# Patient Record
Sex: FEMALE | Race: WHITE
[De-identification: names, ages, dates, MRNs, and addresses within clinical notes are randomized per-mention and may not be internally consistent; named-entity substitution may affect disease eponyms.]

---

## 2017-07-28 ENCOUNTER — HOSPITAL ENCOUNTER (EMERGENCY)
Dept: HOSPITAL 47 - EC | Age: 22
Discharge: HOME | End: 2017-07-28
Payer: COMMERCIAL

## 2017-07-28 VITALS
TEMPERATURE: 97.9 F | RESPIRATION RATE: 20 BRPM | HEART RATE: 82 BPM | SYSTOLIC BLOOD PRESSURE: 121 MMHG | DIASTOLIC BLOOD PRESSURE: 59 MMHG

## 2017-07-28 DIAGNOSIS — Z88.2: ICD-10-CM

## 2017-07-28 DIAGNOSIS — L56.8: ICD-10-CM

## 2017-07-28 DIAGNOSIS — R51: Primary | ICD-10-CM

## 2017-07-28 DIAGNOSIS — Z88.8: ICD-10-CM

## 2017-07-28 DIAGNOSIS — M54.2: ICD-10-CM

## 2017-07-28 PROCEDURE — 99283 EMERGENCY DEPT VISIT LOW MDM: CPT

## 2017-07-28 NOTE — ED
General Adult HPI





- General


Chief complaint: Headache


Stated complaint: Headache


Time Seen by Provider: 07/28/17 15:24


Source: patient, RN notes reviewed


Mode of arrival: ambulatory


Limitations: no limitations





- History of Present Illness


Initial comments: 





Patient 22-year-old female who presents emergency room today with a chief 

complaint of a headache.  She states she's been having headaches off and on 

over the last 3 months.  She believes they are migraine-type headaches that she 

has had some photosensitivity.  States currently not having headache at this 

time.  That over the last few days she's also noticed some neck pain as well.  

She denies any other complaints or associated symptoms.  Denies any injury or 

trauma. states came here to the emergency room because her son has had a fever 

had him come in to be seen and she thought she would be seen at same time as 

she does not have an appointment with the family doctor for 2 weeks.Patient 

denies any recent fever, chills, shortness of breath, chest pain, back pain, 

abdominal pain, nausea or vomiting, numbness or tingling, dysuria or hematuria, 

constipation or diarrhea, or any other complaints.





- Related Data


 Home Medications











 Medication  Instructions  Recorded  Confirmed


 


Albuterol Inhaler [Ventolin Hfa 2 puff INHALATION RT-Q6H PRN 12/12/15 11/23/16





Inhaler]   








 Previous Rx's











 Medication  Instructions  Recorded


 


Nitrofurantoin Monohyd/M-Cryst 100 mg PO Q12HR #10 cap 11/23/16





[Macrobid]  


 


Butalb/APAP/Caff -40Mg 1 tab PO Q4-6H PRN #20 tablet 07/28/17





[Fioricet -40]  











 Allergies











Allergy/AdvReac Type Severity Reaction Status Date / Time


 


promethazine HCl Allergy  Rash/Hives Verified 11/23/16 17:02





[From Phenergan]     


 


Sulfa (Sulfonamide Allergy  Rash/Hives Verified 11/23/16 17:02





Antibiotics)     














Review of Systems


ROS Statement: 


Those systems with pertinent positive or pertinent negative responses have been 

documented in the HPI.





ROS Other: All systems not noted in ROS Statement are negative.





Past Medical History


Past Medical History: Asthma


History of Any Multi-Drug Resistant Organisms: None Reported


Additional Past Surgical History / Comment(s): LEFT LUNG SURGERY AS AN INFANT, 

ear surgery


Past Anesthesia/Blood Transfusion Reactions: No Reported Reaction


Past Psychological History: Anxiety


Smoking Status: Never smoker


Past Alcohol Use History: None Reported


Past Drug Use History: None Reported





- Past Family History


  ** Mother


Family Medical History: Diabetes Mellitus


Additional Family Medical History / Comment(s): heart disease





General Exam





- General Exam Comments


Initial Comments: 





General:  The patient is awake and alert, in no distress, and does not appear 

acutely ill. 


Eye:  Pupils are equal, round and reactive to light, extra-ocular movements are 

intact.  No nystagmus.  There is normal conjunctiva bilaterally.  No signs of 

icterus.  


Ears, nose, mouth and throat:  There are moist mucous membranes and no oral 

lesions. 


Neck:  The neck is supple, there is no tenderness or JVD.  


Cardiovascular:  There is a regular rate and rhythm. No murmur, rub or gallop 

is appreciated.


Respiratory:  Lungs are clear to auscultation, respirations are non-labored, 

breath sounds are equal.  No wheezes, stridor, rales, or rhonchi.


Gastrointestinal:  Soft, non-distended, non-tender abdomen without masses or 

organomegaly noted. There is no rebound or guarding present.  No CVA 

tenderness. Bowel sounds are unremarkable.


Musculoskeletal:  Normal ROM, no tenderness.  Strength 5/5. Sensation intact. 

Pulses equal bilaterally 2+.  


Neurological:  A&O x 3. CN II-XII intact, There are no obvious motor or sensory 

deficits. Coordination appears grossly intact. Speech is normal.


Skin:  Skin is warm and dry and no rashes or lesions are noted. 


Psychiatric:  Cooperative, appropriate mood & affect, normal judgment.  


Limitations: no limitations





Course





 Vital Signs











  07/28/17





  15:00


 


Temperature 97.9 F


 


Pulse Rate 82


 


Respiratory 20





Rate 


 


Blood Pressure 121/59


 


O2 Sat by Pulse 97





Oximetry 














Medical Decision Making





- Medical Decision Making





patient will be treated with a short prescription of Fioricet to try for her 

headaches.  Advised follow-up the family doctor.  Advised return here to the 

emergency room for any other concerns.





Disposition


Clinical Impression: 


 Acute headache





Disposition: HOME SELF-CARE


Condition: Good


Instructions:  Acute Headache (ED)


Additional Instructions: 


Please use medication as discussed.  Please follow-up with family doctor in the 

next 2 days of symptoms have not improved.  Please return to emergency room if 

the symptoms increase or worsen or for any other concerns.


Prescriptions: 


Butalb/APAP/Caff -40Mg [Fioricet -40] 1 tab PO Q4-6H PRN #20 tablet


 PRN Reason: Headache


Referrals: 


Crescencio Cueva MD [Primary Care Provider] - 1-2 days


Time of Disposition: 15:49

## 2018-11-06 ENCOUNTER — HOSPITAL ENCOUNTER (OUTPATIENT)
Dept: HOSPITAL 47 - FBPOP | Age: 23
Discharge: HOME | End: 2018-11-06
Attending: OBSTETRICS & GYNECOLOGY
Payer: COMMERCIAL

## 2018-11-06 ENCOUNTER — HOSPITAL ENCOUNTER (EMERGENCY)
Dept: HOSPITAL 47 - EC | Age: 23
Discharge: HOME | End: 2018-11-06
Payer: COMMERCIAL

## 2018-11-06 VITALS
DIASTOLIC BLOOD PRESSURE: 56 MMHG | HEART RATE: 96 BPM | SYSTOLIC BLOOD PRESSURE: 117 MMHG | TEMPERATURE: 97.4 F | RESPIRATION RATE: 18 BRPM

## 2018-11-06 VITALS
RESPIRATION RATE: 20 BRPM | DIASTOLIC BLOOD PRESSURE: 65 MMHG | HEART RATE: 82 BPM | SYSTOLIC BLOOD PRESSURE: 106 MMHG | TEMPERATURE: 97.9 F

## 2018-11-06 DIAGNOSIS — J06.9: ICD-10-CM

## 2018-11-06 DIAGNOSIS — Z88.5: ICD-10-CM

## 2018-11-06 DIAGNOSIS — R10.9: ICD-10-CM

## 2018-11-06 DIAGNOSIS — Z3A.25: ICD-10-CM

## 2018-11-06 DIAGNOSIS — Z87.891: ICD-10-CM

## 2018-11-06 DIAGNOSIS — Z88.2: ICD-10-CM

## 2018-11-06 DIAGNOSIS — Z88.8: ICD-10-CM

## 2018-11-06 DIAGNOSIS — O99.512: Primary | ICD-10-CM

## 2018-11-06 DIAGNOSIS — Z3A.24: ICD-10-CM

## 2018-11-06 DIAGNOSIS — O99.89: Primary | ICD-10-CM

## 2018-11-06 LAB
KETONES UR QL STRIP.AUTO: (no result)
PH UR: 6.5 [PH] (ref 5–8)
PROT UR QL: (no result)
RBC UR QL: 2 /HPF (ref 0–5)
SP GR UR: 1.02 (ref 1–1.03)
SQUAMOUS UR QL AUTO: 10 /HPF (ref 0–4)
UROBILINOGEN UR QL STRIP: 4 MG/DL (ref ?–2)
WBC #/AREA URNS HPF: 31 /HPF (ref 0–5)

## 2018-11-06 PROCEDURE — 87086 URINE CULTURE/COLONY COUNT: CPT

## 2018-11-06 PROCEDURE — 87430 STREP A AG IA: CPT

## 2018-11-06 PROCEDURE — 87081 CULTURE SCREEN ONLY: CPT

## 2018-11-06 PROCEDURE — 99213 OFFICE O/P EST LOW 20 MIN: CPT

## 2018-11-06 PROCEDURE — 99283 EMERGENCY DEPT VISIT LOW MDM: CPT

## 2018-11-06 PROCEDURE — 81001 URINALYSIS AUTO W/SCOPE: CPT

## 2018-11-06 NOTE — ED
ENT HPI





- General


Chief complaint: ENT


Stated complaint: Sore throat


Time Seen by Provider: 11/06/18 21:24


Source: patient, RN notes reviewed, old records reviewed


Mode of arrival: ambulatory


Limitations: no limitations





- History of Present Illness


Initial comments: 





23 year old female, currently 25 weeks pregnant presents with one day with sore 

throat, sinus congestion, and minor cough. She is here with erh son with 

similar complaints. Denies vaginal bleeding or abdominal pain. She has no chest 

pain or shortness of breath. She has been taking tylenol, sudafed, adn 

benadryl. She called off of work today. 





- Related Data


 Home Medications











 Medication  Instructions  Recorded  Confirmed


 


Prenatal Vit-Iron-Folic Acid 1 cap PO DAILY 11/06/18 11/06/18





[Prenatal-U Capsule (formulary)]   








 Previous Rx's











 Medication  Instructions  Recorded


 


Acetaminophen Tab [Tylenol Tab] 500 mg PO Q6H #20 tablet 11/06/18











 Allergies











Allergy/AdvReac Type Severity Reaction Status Date / Time


 


codeine Allergy  Unknown Verified 11/06/18 21:38


 


promethazine HCl Allergy  Rash/Hives Verified 11/06/18 21:38





[From Phenergan]     


 


Sulfa (Sulfonamide Allergy  Rash/Hives Verified 11/06/18 21:38





Antibiotics)     














Review of Systems


ROS Statement: 


Those systems with pertinent positive or pertinent negative responses have been 

documented in the HPI.





ROS Other: All systems not noted in ROS Statement are negative.





Past Medical History


Past Medical History: Asthma


History of Any Multi-Drug Resistant Organisms: None Reported


Additional Past Surgical History / Comment(s): LEFT LUNG SURGERY AS AN INFANT, 

ear surgery


Past Anesthesia/Blood Transfusion Reactions: No Reported Reaction


Past Psychological History: Anxiety


Smoking Status: Former smoker





- Past Family History


  ** Mother


Family Medical History: Diabetes Mellitus


Additional Family Medical History / Comment(s): heart disease





General Exam





- General Exam Comments


Initial Comments: 





well appearing 23 year old female, no distress. 


Limitations: no limitations


General appearance: alert, in no apparent distress


Head exam: Present: atraumatic, normocephalic, normal inspection


Eye exam: Present: normal appearance, PERRL, EOMI.  Absent: scleral icterus, 

conjunctival injection, periorbital swelling


ENT exam: Present: normal exam, mucous membranes moist.  Absent: normal 

oropharynx (erythematous oropharynx. No exudate)


Neck exam: Present: normal inspection, lymphadenopathy.  Absent: tenderness, 

meningismus


Respiratory exam: Present: normal lung sounds bilaterally.  Absent: respiratory 

distress, wheezes, rales, rhonchi, stridor


Cardiovascular Exam: Present: regular rate, normal rhythm, normal heart sounds.

  Absent: systolic murmur, diastolic murmur, rubs, gallop, clicks


Back exam: Present: normal inspection


Neurological exam: Present: alert, oriented X3, CN II-XII intact


Psychiatric exam: Present: normal affect, normal mood





Course


 Vital Signs











  11/06/18





  21:14


 


Temperature 97.9 F


 


Pulse Rate 82


 


Respiratory 20





Rate 


 


Blood Pressure 106/65


 


O2 Sat by Pulse 99





Oximetry 














Medical Decision Making





- Medical Decision Making





23 year old female with viral upper respiratory symptoms for one day, including 

sore throat, congestion, and mild cough. Strep is negative. Patient vital signs 

are stable, lungs are clear. She is pregnant. Discussed symtpmatic treatment, 

and return parameters discussed. 





- Lab Data


 Lab Results











  11/06/18 Range/Units





  21:33 


 


Group A Strep Rapid  Negative  (Negative)  














Disposition


Clinical Impression: 


 Viral upper respiratory infection





Disposition: HOME SELF-CARE


Condition: Good


Additional Instructions: 


Patient advised to take Tylenol for headache.  Continue Benadryl Sudafed for 

the assistance of symptoms.  Follow-up with primary care physician.  Return to 

the emergency department if any alarming signs or symptoms occur.


Prescriptions: 


Acetaminophen Tab [Tylenol Tab] 500 mg PO Q6H #20 tablet


Is patient prescribed a controlled substance at d/c from ED?: No


Referrals: 


Alivia Marina MD [Primary Care Provider] - 1-2 days


Time of Disposition: 22:15

## 2018-11-13 ENCOUNTER — HOSPITAL ENCOUNTER (OUTPATIENT)
Dept: HOSPITAL 47 - FBPOP | Age: 23
Discharge: HOME | End: 2018-11-13
Attending: OBSTETRICS & GYNECOLOGY
Payer: COMMERCIAL

## 2018-11-13 VITALS
HEART RATE: 91 BPM | SYSTOLIC BLOOD PRESSURE: 118 MMHG | TEMPERATURE: 98.1 F | DIASTOLIC BLOOD PRESSURE: 62 MMHG | RESPIRATION RATE: 16 BRPM

## 2018-11-13 DIAGNOSIS — Z3A.25: ICD-10-CM

## 2018-11-13 DIAGNOSIS — O99.89: Primary | ICD-10-CM

## 2018-11-13 DIAGNOSIS — R10.30: ICD-10-CM

## 2018-11-13 LAB
PH UR: 7 [PH] (ref 5–8)
RBC UR QL: 8 /HPF (ref 0–5)
SP GR UR: 1.01 (ref 1–1.03)
SQUAMOUS UR QL AUTO: 6 /HPF (ref 0–4)
UROBILINOGEN UR QL STRIP: 2 MG/DL (ref ?–2)
WBC #/AREA URNS HPF: 48 /HPF (ref 0–5)

## 2018-11-13 PROCEDURE — 96365 THER/PROPH/DIAG IV INF INIT: CPT

## 2018-11-13 PROCEDURE — 81001 URINALYSIS AUTO W/SCOPE: CPT

## 2018-11-13 PROCEDURE — 96361 HYDRATE IV INFUSION ADD-ON: CPT

## 2018-11-13 PROCEDURE — 96375 TX/PRO/DX INJ NEW DRUG ADDON: CPT

## 2018-11-15 ENCOUNTER — HOSPITAL ENCOUNTER (OUTPATIENT)
Dept: HOSPITAL 47 - FBPOP | Age: 23
Discharge: HOME | End: 2018-11-15
Attending: OBSTETRICS & GYNECOLOGY
Payer: COMMERCIAL

## 2018-11-15 VITALS
RESPIRATION RATE: 16 BRPM | SYSTOLIC BLOOD PRESSURE: 122 MMHG | TEMPERATURE: 98.2 F | DIASTOLIC BLOOD PRESSURE: 58 MMHG | HEART RATE: 94 BPM

## 2018-11-15 DIAGNOSIS — O26.93: Primary | ICD-10-CM

## 2018-11-15 DIAGNOSIS — Z3A.26: ICD-10-CM

## 2018-11-15 PROCEDURE — 99213 OFFICE O/P EST LOW 20 MIN: CPT

## 2018-11-15 PROCEDURE — 84112 EVAL AMNIOTIC FLUID PROTEIN: CPT

## 2018-11-19 NOTE — P.MSEPDOC
Presenting Problems





- Arrival Data


Date of Arrival on Unit: 18


Time of Arrival on Unit: 19:45


Mode of Transport: Ambulatory





- Complaint


OB-Reason for Admission/Chief Complaint: Pain


Comment: suprapubic cramping, abdomen soft to palpation





Prenatal Medical History





- Pregnancy Information


: 5


Para: 3


Term: 2


: 1


Abortions: Spontaneous or Elective: 1


Number of Living Children: 3





- Gestational Age


Gestational Age by MONTSE (wks/days): 24 Weeks and 6 Days





- Prenatal History


Pregnancy Complications: Prior 


Comment: one 33 week delivery





Review of Systems





- Review of Systems


Constitutional: No problems


Breast: No problems


ENT: No problems


Cardiovascular: No problems


Respiratory: No problems


Gastrointestinal: No problems


Genitourinary: Increased frequency


Musculoskeletal: No problems


Neurological: No problems


Skin: No problems





Vital Signs





- Temperature


Temperature: 97.4 F


Temperature Source: Temporal Artery Scan





- Pulse


  ** Right Brachial


Pulse Rate: 96


Pulse Assessment Method: Automatic Cuff





- Respirations


Respiratory Rate: 18


Oxygen Delivery Method: Room Air





- Blood Pressure


  ** Right Arm


Blood Pressure: 117/56


Blood Pressure Mean: 76


Blood Pressure Source: Automatic Cuff





Medical Screen Scoring (Pre)





- Cervical Exam


Dilation: Exam Deferred


Effacement: Exam Deferred


Membranes: Intact





- Uterine Contractions


Frequency: N/A


Duration: N/A


Intensity: N/A





- Maternal Vital Signs


Maternal Temperature: N/A


Maternal Blood Pressure: N/A


Signs of Preeclampsia: N/A


Maternal Respirations: N/A





- Pain Assessment


Pain Location and Character: Head


Pain Scale Used: Numeric (1 - 10)


Pain Intensity: 6


Pain Description: *Acute, Aching


Pain Radiation Location: none


Pain Frequency: Constant


Pain Duration: 1


Pain Duration Units: Days


Pain Behavior: None Exhibited


Pain Aggravating Factors: None


Pharmacological Interventions: Discuss Pain Med Options


Non-Pharmacological Interventions: Darkened Room





- Maternal Trauma


Maternal Trauma: N/A





- Fetal Assessment


Baseline FHR: 125


Fetal Heart Rate - NICHD Category: Category I (Normal) = 0


NST: Reactive


Fetal Position: N/A


Fetal Station: N/A





- Total Score


Total Score (Pre): 0





- Level of Risk


Level of Risk: Low (0-5)





Medical Screen Scoring (Post)





- Fetal Assessment


Fetal Heart Rate: 130


Fetal Heart Rate - NICHD Category: Category I (Normal) = 0


Fetal Position: N/A





- Total Score


Total Score (Post): 0





- Post Treatment Level of Risk


Post Treatment Level of Risk: Low (0-5)





Physician Notification (Post)





- Physician Notified


Physician Notified Date: 18


Physician Notified Time: 21:02


Spoke With: Osbaldo


New Order Received: Yes (culture urine, discharge for follow up in [facility])





Disposition





- Disposition


OB Disposition: Discharge to home, Written follow up instructions reviewed


Discharge Date: 18


Discharge Time: 21:10


I agree with the RN Medical Screening Exam: No


Physician's MSE Comment: 





Insufficient documentation


Risk & Benefit of care provided described in d/c instruction: No


Diagnosis: UNSPECIFIED ABDOMINAL PAIN

## 2018-11-27 NOTE — P.MSEPDOC
Presenting Problems





- Arrival Data


Date of Arrival on Unit: 18


Time of Arrival on Unit: 14:41


Mode of Transport: Ambulatory





- Complaint


OB-Reason for Admission/Chief Complaint: Pain


Comment: lower abd pain





Prenatal Medical History





- Pregnancy Information


: 4


Para: 3





- Gestational Age


Gestational Age by MONTSE (wks/days): 25 Weeks and 6 Days





- Prenatal History


Pregnancy Complications: Prior 





Review of Systems





- Review of Systems


Constitutional: No problems


Breast: No problems


ENT: No problems


Cardiovascular: No problems


Respiratory: No problems


Gastrointestinal: No problems


Genitourinary: No problems


Musculoskeletal: No problems


Neurological: No problems


Skin: No problems





Vital Signs





- Temperature


Temperature: 98.1 F





- Pulse


  ** Right Sitting Brachial


Pulse Rate: 91


Pulse Assessment Method: Automatic Cuff





- Respirations


Respiratory Rate: 16


Oxygen Delivery Method: Room Air


O2 Sat by Pulse Oximetry: 98





- Blood Pressure


  ** Right Arm Sitting


Blood Pressure: 118/62


Blood Pressure Mean: 80


Blood Pressure Source: Automatic Cuff





Medical Screen Scoring (Pre)





- Cervical Exam


Dilation: 0 cm = 0


Membranes: Intact





- Uterine Contractions


Frequency: N/A


Duration: N/A


Intensity: N/A





- Maternal Vital Signs


Maternal Temperature: N/A


Maternal Blood Pressure: N/A


Signs of Preeclampsia: N/A


Maternal Respirations: N/A





- Pain Assessment


Pain Location and Character: Lower, Abdomen


Pain Scale Used: Numeric (1 - 10)


Pain Intensity: 7


Pain Management Goal: 0


Pain Description: Cramping


Pain Radiation Location: 0


Pain Frequency: Frequent


Pain Duration: 12


Pain Duration Units: Hours


Pain Behavior: None Exhibited


Effects of Pain: 0


Pain Aggravating Factors: None


Non-Pharmacological Interventions: Heat





- Maternal Trauma


Maternal Trauma: N/A





- Fetal Assessment


Baseline FHR: 130


Fetal Heart Rate - NICHD Category: Category I (Normal) = 0


Fetal Position: N/A


Fetal Station: N/A





- Total Score


Total Score (Pre): 0





- Level of Risk


Level of Risk: Low (0-5)





Physician Notification (Pre)





- Physician Notified


Physician Notified Date: 18


Physician Notified Time: 15:30


Physician/Practitioner Notifed:: Dr Hidalgo


Spoke With: dr hidalgo


New Order Received: Yes





- Notification Comment


Comment: u/a check cervix





Disposition





- Disposition


Discharge Date: 18


Discharge Time: 17:44


I agree with the RN Medical Screening Exam: Yes


Risk & Benefit of care provided described in d/c instruction: Yes


Diagnosis: PAIN, UNSPECIFIED

## 2018-12-06 NOTE — P.MSEPDOC
Presenting Problems





- Arrival Data


Date of Arrival on Unit: 11/15/18


Time of Arrival on Unit: 20:20


Mode of Transport: Ambulatory





- Complaint


OB-Reason for Admission/Chief Complaint: Other


Comment: vaginal discharge





Prenatal Medical History





- Pregnancy Information


: 4


Para: 3


Term: 2


: 1


Abortions: Spontaneous or Elective: 0


Number of Living Children: 3





- Gestational Age


Gestational Age by MONTSE (wks/days): 26 Weeks and 1 Days





- Prenatal History


Pregnancy Complications: Prior 





Review of Systems





- Review of Systems


Constitutional: No problems


Breast: No problems


ENT: No problems


Cardiovascular: No problems


Respiratory: No problems


Gastrointestinal: No problems


Genitourinary: No problems


Musculoskeletal: No problems


Neurological: No problems


Skin: No problems





Vital Signs





- Temperature


Temperature: 98.2 F


Temperature Source: Tympanic





- Pulse


  ** Right Brachial


Pulse Rate: 94


Pulse Assessment Method: Automatic Cuff





- Respirations


Respiratory Rate: 16


Oxygen Delivery Method: Room Air





- Blood Pressure


  ** Right Arm


Blood Pressure: 122/58


Blood Pressure Mean: 79


Blood Pressure Source: Automatic Cuff





Medical Screen Scoring (Pre)





- Cervical Exam


Dilation: 0 cm = 0


Membranes: Intact





- Uterine Contractions


Frequency: N/A


Duration: N/A


Intensity: N/A





- Maternal Vital Signs


Maternal Temperature: N/A


Maternal Blood Pressure: N/A


Signs of Preeclampsia: N/A


Maternal Respirations: N/A





- Pain Assessment


Pain Intensity: 0


Pain Management Goal: 0





- Maternal Trauma


Maternal Trauma: N/A





- Fetal Assessment


Baseline FHR: 140


Fetal Heart Rate - NICHD Category: Category I (Normal) = 0


Fetal Position: N/A


Fetal Station: N/A





- Total Score


Total Score (Pre): 0





- Level of Risk


Level of Risk: N/A





Physician Notification (Pre)





- Physician Notified


Physician Notified Date: 11/15/18


Physician Notified Time: 21:00


Physician/Practitioner Notifed:: Dr. Ambriz


Spoke With: Dr. Ambriz


New Order Received: Yes





- Notification Comment


Comment: discharge home





Disposition





- Disposition


OB Disposition: Discharge to home


Discharge Date: 11/15/18


Discharge Time: 21:20


I agree with the RN Medical Screening Exam: Yes


Risk & Benefit of care provided described in d/c instruction: Yes


Diagnosis: PREGNANCY RELATED CONDITIONS, UNSPECIFIED, THIRD TRIMESTER

## 2018-12-09 ENCOUNTER — HOSPITAL ENCOUNTER (OUTPATIENT)
Dept: HOSPITAL 47 - FBPOP | Age: 23
Setting detail: OBSERVATION
LOS: 1 days | Discharge: HOME | End: 2018-12-10
Attending: OBSTETRICS & GYNECOLOGY | Admitting: OBSTETRICS & GYNECOLOGY
Payer: COMMERCIAL

## 2018-12-09 VITALS
DIASTOLIC BLOOD PRESSURE: 80 MMHG | RESPIRATION RATE: 16 BRPM | SYSTOLIC BLOOD PRESSURE: 133 MMHG | TEMPERATURE: 98.1 F | HEART RATE: 102 BPM

## 2018-12-09 VITALS — BODY MASS INDEX: 28.3 KG/M2

## 2018-12-09 DIAGNOSIS — Z83.3: ICD-10-CM

## 2018-12-09 DIAGNOSIS — O60.03: Primary | ICD-10-CM

## 2018-12-09 DIAGNOSIS — F41.9: ICD-10-CM

## 2018-12-09 DIAGNOSIS — Z88.8: ICD-10-CM

## 2018-12-09 DIAGNOSIS — Z3A.29: ICD-10-CM

## 2018-12-09 DIAGNOSIS — Z88.5: ICD-10-CM

## 2018-12-09 DIAGNOSIS — O99.513: ICD-10-CM

## 2018-12-09 DIAGNOSIS — Z88.2: ICD-10-CM

## 2018-12-09 DIAGNOSIS — O34.219: ICD-10-CM

## 2018-12-09 DIAGNOSIS — J45.909: ICD-10-CM

## 2018-12-09 DIAGNOSIS — Z82.49: ICD-10-CM

## 2018-12-09 DIAGNOSIS — O99.343: ICD-10-CM

## 2018-12-09 DIAGNOSIS — O99.013: ICD-10-CM

## 2018-12-09 LAB
BASOPHILS # BLD AUTO: 0 K/UL (ref 0–0.2)
BASOPHILS NFR BLD AUTO: 0 %
EOSINOPHIL # BLD AUTO: 0.2 K/UL (ref 0–0.7)
EOSINOPHIL NFR BLD AUTO: 1 %
ERYTHROCYTE [DISTWIDTH] IN BLOOD BY AUTOMATED COUNT: 3.74 M/UL (ref 3.8–5.4)
ERYTHROCYTE [DISTWIDTH] IN BLOOD: 18.2 % (ref 11.5–15.5)
HCT VFR BLD AUTO: 26.6 % (ref 34–46)
HGB BLD-MCNC: 7.9 GM/DL (ref 11.4–16)
LYMPHOCYTES # SPEC AUTO: 0.9 K/UL (ref 1–4.8)
LYMPHOCYTES NFR SPEC AUTO: 8 %
MCH RBC QN AUTO: 21.2 PG (ref 25–35)
MCHC RBC AUTO-ENTMCNC: 29.8 G/DL (ref 31–37)
MCV RBC AUTO: 71.2 FL (ref 80–100)
MONOCYTES # BLD AUTO: 0.2 K/UL (ref 0–1)
MONOCYTES NFR BLD AUTO: 2 %
NEUTROPHILS # BLD AUTO: 9.7 K/UL (ref 1.3–7.7)
NEUTROPHILS NFR BLD AUTO: 88 %
PLATELET # BLD AUTO: 203 K/UL (ref 150–450)
WBC # BLD AUTO: 11 K/UL (ref 3.8–10.6)

## 2018-12-09 PROCEDURE — 86850 RBC ANTIBODY SCREEN: CPT

## 2018-12-09 PROCEDURE — 86900 BLOOD TYPING SEROLOGIC ABO: CPT

## 2018-12-09 PROCEDURE — 86880 COOMBS TEST DIRECT: CPT

## 2018-12-09 PROCEDURE — 59025 FETAL NON-STRESS TEST: CPT

## 2018-12-09 PROCEDURE — 86870 RBC ANTIBODY IDENTIFICATION: CPT

## 2018-12-09 PROCEDURE — 96372 THER/PROPH/DIAG INJ SC/IM: CPT

## 2018-12-09 PROCEDURE — 96360 HYDRATION IV INFUSION INIT: CPT

## 2018-12-09 PROCEDURE — 81001 URINALYSIS AUTO W/SCOPE: CPT

## 2018-12-09 PROCEDURE — 99214 OFFICE O/P EST MOD 30 MIN: CPT

## 2018-12-09 PROCEDURE — 96361 HYDRATE IV INFUSION ADD-ON: CPT

## 2018-12-09 PROCEDURE — 86901 BLOOD TYPING SEROLOGIC RH(D): CPT

## 2018-12-09 PROCEDURE — 85025 COMPLETE CBC W/AUTO DIFF WBC: CPT

## 2018-12-09 RX ADMIN — POTASSIUM CHLORIDE SCH MLS/HR: 14.9 INJECTION, SOLUTION INTRAVENOUS at 23:00

## 2018-12-09 RX ADMIN — BETAMETHASONE SODIUM PHOSPHATE AND BETAMETHASONE ACETATE SCH MG: 3; 3 INJECTION, SUSPENSION INTRA-ARTICULAR; INTRALESIONAL; INTRAMUSCULAR at 20:47

## 2018-12-09 NOTE — P.HPOB
History of Present Illness


H&P Date: 18


Chief Complaint: Tractions





This is a 23-year-old  5 para 2113 woman with an estimated due date of  based on LMP consistent with first trimester ultrasound.  She presents 

at 29 weeks gestation with intermittent painful uterine contractions over the 

last several hours.  She was seen on 2018 also for contractions and was 

found at that time to be 2 cm dilated.  She had a negative fetal fibronectin 

and a cervical length ultrasound of 3.6 cm.  She has been on at home on 

decreased activity since that time.  She was out and about today and did notice 

some increasing contraction pain but denies leakage of fluids or vaginal 

bleeding.





Her obstetric history is significant for a 33 week  delivery by  

section for breech presentation in 2016..  She presented with advanced cervical 

dilation at that time.  She had term vaginal deliveries in  and .  She 

also has an 8 week spontaneous miscarriage in 2017.  Pregnancy has otherwise 

been complicated by anemia and asthma.  She is known Rh- and did recently 

received Rh Ig.





Upon initial evaluation here she was confirmed to be 2 cm dilated and 

approximately 50% effaced with a high presenting part.  She was not regularly 

sourav however I decided to admit her for steroids and observation 

secondary to her history of previous  delivery.  When she was settled 

into her room and back onto the tocometer she was found to be sourav every 

2-6 minutes.  My repeat cervical exam is reassuring for no change, 2 cm dilated

, 50% effaced, vertex in the -3 station and posterior.  Vertex is confirmed by 

bedside ultrasound.  Fetal heart tones are reassuring by external fetal 

monitoring.





Review of Systems


All systems: negative





Past Medical History


Past Medical History: Asthma


Additional Past Medical History / Comment(s): Anemia


History of Any Multi-Drug Resistant Organisms: None Reported


Past Surgical History: No Surgical Hx Reported


Additional Past Surgical History / Comment(s): LEFT LUNG SURGERY AS AN INFANT, 

ear surgery.   section 2016


Past Anesthesia/Blood Transfusion Reactions: No Reported Reaction


Past Psychological History: Anxiety


Smoking Status: Never smoker


Past Alcohol Use History: None Reported


Past Drug Use History: None Reported





- Past Family History


  ** Mother


Family Medical History: Diabetes Mellitus


Additional Family Medical History / Comment(s): heart disease





Medications and Allergies


 Home Medications











 Medication  Instructions  Recorded  Confirmed  Type


 


Prenatal Vit-Iron-Folic Acid 1 cap PO DAILY 18 History





[Prenatal-U Capsule (formulary)]    











 Allergies











Allergy/AdvReac Type Severity Reaction Status Date / Time


 


codeine Allergy  Unknown Verified 18 19:58


 


promethazine HCl Allergy  Rash/Hives Verified 18 19:58





[From Phenergan]     


 


Sulfa (Sulfonamide Allergy  Rash/Hives Verified 18 19:58





Antibiotics)     














Exam


 Vital Signs











  Temp Pulse Resp BP Pulse Ox


 


 18 21:29  98.1 F  102 H  16  133/80 


 


 18 20:13  98.1 F  102 H  16  133/80  99








 Intake and Output











 12/09/18 12/09/18 12/10/18





 14:59 22:59 06:59


 


Other:   


 


  # Voids  1 


 


  Weight  70.307 kg 














This is a pleasant, comfortable appearing  female who is visibly 

gravid.  HEENT exam is unremarkable.  Her breathing is unlabored and heart is a 

regular rate and rhythm.  The abdomen is gravid, soft and nontender with no 

palpable contractions.  She is 2 cm dilated 50% effaced and the vertex is in 

the -3 station, posterior cervix.  Extremities are free of any edema or rash.  

Reassuring fetal heart tones by external fetal monitoring with good variability 

and accelerations, no decelerations.  Irregular contractions noted.





Results


Result Diagrams: 


 18 23:15





 Abnormal Lab Results - Last 24 Hours (Table)











  18 Range/Units





  23:15 


 


WBC  11.0 H  (3.8-10.6)  k/uL


 


RBC  3.74 L  (3.80-5.40)  m/uL


 


Hgb  7.9 L  (11.4-16.0)  gm/dL


 


Hct  26.6 L  (34.0-46.0)  %


 


MCV  71.2 L  (80.0-100.0)  fL


 


MCH  21.2 L  (25.0-35.0)  pg


 


MCHC  29.8 L  (31.0-37.0)  g/dL


 


RDW  18.2 H  (11.5-15.5)  %














Assessment and Plan


(1) 29 weeks gestation of pregnancy


Current Visit: Yes   Status: Acute   Code(s): Z3A.29 - 29 WEEKS GESTATION OF 

PREGNANCY   SNOMED Code(s): 20224912


   





(2) Anemia


Current Visit: No   Status: Acute   Code(s): D64.9 - ANEMIA, UNSPECIFIED   

SNOMED Code(s): 147488050


   





(3)  labor


Current Visit: Yes   Status: Acute   Code(s): O60.00 -  LABOR WITHOUT 

DELIVERY, UNSPECIFIED TRIMESTER   SNOMED Code(s): 1803048


   





(4) Rh negative, maternal


Current Visit: No   Status: Acute   Code(s): O09.899 - SUPERVISION OF OTHER 

HIGH RISK PREGNANCIES, UNSP TRIMESTER   SNOMED Code(s): 851093346


   





(5) History of 


Current Visit: Yes   Status: Acute   Code(s): Z98.891 - HISTORY OF UTERINE SCAR 

FROM PREVIOUS SURGERY   SNOMED Code(s): 852021420


   





(6) Asthma


Current Visit: Yes   Status: Acute   Code(s): J45.909 - UNSPECIFIED ASTHMA, 

UNCOMPLICATED   SNOMED Code(s): 986207338


   


Plan: 





This is a 23-year-old  5 para 2113 woman who presents at 29 weeks 

gestation with  contractions and a history of previous 33 week  

delivery.  Fetal fibronectin negative and reassuring cervical length on 2018.  She will be admitted for observation for  labor.  She received 

her first dose of betamethasone which will be repeated in 24 hours.  She will 

be started on Procardia in hopes of decreasing her irregular contraction 

activity.  Should this fail or her cervix change magnesium sulfate will be 

initiated.  Fetal status is currently reassuring by external fetal monitoring.

## 2018-12-10 LAB
PH UR: 7 [PH] (ref 5–8)
RBC UR QL: 1 /HPF (ref 0–5)
SP GR UR: 1.01 (ref 1–1.03)
SQUAMOUS UR QL AUTO: 2 /HPF (ref 0–4)
UROBILINOGEN UR QL STRIP: 2 MG/DL (ref ?–2)
WBC #/AREA URNS HPF: 5 /HPF (ref 0–5)

## 2018-12-10 RX ADMIN — POTASSIUM CHLORIDE SCH MLS/HR: 14.9 INJECTION, SOLUTION INTRAVENOUS at 11:24

## 2018-12-10 RX ADMIN — BETAMETHASONE SODIUM PHOSPHATE AND BETAMETHASONE ACETATE SCH MG: 3; 3 INJECTION, SUSPENSION INTRA-ARTICULAR; INTRALESIONAL; INTRAMUSCULAR at 20:47

## 2018-12-10 RX ADMIN — Medication SCH MG: at 07:58

## 2018-12-10 RX ADMIN — POTASSIUM CHLORIDE SCH MLS/HR: 14.9 INJECTION, SOLUTION INTRAVENOUS at 17:53

## 2018-12-10 RX ADMIN — Medication SCH MG: at 17:51

## 2018-12-10 RX ADMIN — POTASSIUM CHLORIDE SCH MLS/HR: 14.9 INJECTION, SOLUTION INTRAVENOUS at 04:52

## 2018-12-10 NOTE — P.PN
Subjective


Progress Note Date: 12/10/18


Principal diagnosis: 





IUP @ 29 1/7 weeks, PT ctx with h/o PPD 33 weeks





Patient states she did well overnight.  Contractions are less frequent and less 

uncomfortable.  She was started on Procardia and this seems to be decreasing 

the frequency of the contractions.  She denies vaginal bleeding or loss of 

fluid.  She is noting good fetal movement.  She is tired and she hasn't slept 

overnight.





Objective





- Vital Signs


Vital signs: 


 Vital Signs











Temp  98.1 F   18 21:29


 


Pulse  102 H  18 21:29


 


Resp  16   18 21:29


 


BP  133/80   18 21:29


 


Pulse Ox  99   18 20:13








 Intake & Output











 12/09/18 12/10/18 12/10/18





 18:59 06:59 18:59


 


Weight  70.307 kg 


 


Other:   


 


  # Voids  1 














- Constitutional


General appearance: Present: average body habitus, cooperative, no acute 

distress





- Gastrointestinal


Gastrointestinal Comment(s): 





Gravid and appropriate for gestational age


General gastrointestinal: Present: soft





- Genitourinary


Genitourinary Comment(s): 





Cervix is noted to be posterior, 2/50/high presentation.





- Psychiatric


Psychiatric: Present: A&O x's 3, appropriate affect





- Labs


CBC & Chem 7: 


 18 23:15





Labs: 


 Abnormal Lab Results - Last 24 Hours (Table)











  18 Range/Units





  23:15 


 


WBC  11.0 H  (3.8-10.6)  k/uL


 


RBC  3.74 L  (3.80-5.40)  m/uL


 


Hgb  7.9 L  (11.4-16.0)  gm/dL


 


Hct  26.6 L  (34.0-46.0)  %


 


MCV  71.2 L  (80.0-100.0)  fL


 


MCH  21.2 L  (25.0-35.0)  pg


 


MCHC  29.8 L  (31.0-37.0)  g/dL


 


RDW  18.2 H  (11.5-15.5)  %


 


Neutrophils #  9.7 H  (1.3-7.7)  k/uL


 


Lymphocytes #  0.9 L  (1.0-4.8)  k/uL














Assessment and Plan


(1) 29 weeks gestation of pregnancy


Current Visit: Yes   Status: Acute   Code(s): Z3A.29 - 29 WEEKS GESTATION OF 

PREGNANCY   SNOMED Code(s): 31901470


   





(2) Asthma


Current Visit: Yes   Status: Acute   Code(s): J45.909 - UNSPECIFIED ASTHMA, 

UNCOMPLICATED   SNOMED Code(s): 181154186


   





(3) History of 


Current Visit: Yes   Status: Acute   Code(s): Z98.891 - HISTORY OF UTERINE SCAR 

FROM PREVIOUS SURGERY   SNOMED Code(s): 696084683


   





(4)  labor


Current Visit: Yes   Status: Acute   Code(s): O60.00 -  LABOR WITHOUT 

DELIVERY, UNSPECIFIED TRIMESTER   SNOMED Code(s): 4215473


   





(5) Rh negative, maternal


Current Visit: No   Status: Acute   Code(s): O09.899 - SUPERVISION OF OTHER 

HIGH RISK PREGNANCIES, UNSP TRIMESTER   SNOMED Code(s): 609863386


   


Plan: 





We'll monitor patient today for any signs of breakthrough contractions on the 

Procardia.  We will consider discharge after her second dose of betamethasone 

this evening.  If her contractions remain minimal/absent we will discharge home 

on bedrest and have her follow closely myself in the office.

## 2018-12-11 ENCOUNTER — HOSPITAL ENCOUNTER (OUTPATIENT)
Dept: HOSPITAL 47 - FBPOP | Age: 23
Discharge: HOME | End: 2018-12-11
Attending: OBSTETRICS & GYNECOLOGY
Payer: COMMERCIAL

## 2018-12-11 VITALS — SYSTOLIC BLOOD PRESSURE: 113 MMHG | RESPIRATION RATE: 16 BRPM | TEMPERATURE: 98.1 F | DIASTOLIC BLOOD PRESSURE: 58 MMHG

## 2018-12-11 VITALS — HEART RATE: 97 BPM

## 2018-12-11 DIAGNOSIS — Z3A.29: ICD-10-CM

## 2018-12-11 DIAGNOSIS — O26.92: Primary | ICD-10-CM

## 2018-12-11 PROCEDURE — 99214 OFFICE O/P EST MOD 30 MIN: CPT

## 2018-12-11 PROCEDURE — 96360 HYDRATION IV INFUSION INIT: CPT

## 2018-12-11 PROCEDURE — 96367 TX/PROPH/DG ADDL SEQ IV INF: CPT

## 2018-12-11 PROCEDURE — 59025 FETAL NON-STRESS TEST: CPT

## 2018-12-19 ENCOUNTER — HOSPITAL ENCOUNTER (OUTPATIENT)
Dept: HOSPITAL 47 - FBPOP | Age: 23
Discharge: HOME | End: 2018-12-19
Attending: OBSTETRICS & GYNECOLOGY
Payer: COMMERCIAL

## 2018-12-19 VITALS
HEART RATE: 98 BPM | SYSTOLIC BLOOD PRESSURE: 131 MMHG | DIASTOLIC BLOOD PRESSURE: 63 MMHG | RESPIRATION RATE: 18 BRPM | TEMPERATURE: 97.2 F

## 2018-12-19 DIAGNOSIS — O21.9: Primary | ICD-10-CM

## 2018-12-19 DIAGNOSIS — Z3A.31: ICD-10-CM

## 2018-12-19 LAB
BASOPHILS # BLD AUTO: 0 K/UL (ref 0–0.2)
BASOPHILS NFR BLD AUTO: 0 %
EOSINOPHIL # BLD AUTO: 0.1 K/UL (ref 0–0.7)
EOSINOPHIL NFR BLD AUTO: 1 %
ERYTHROCYTE [DISTWIDTH] IN BLOOD BY AUTOMATED COUNT: 3.4 M/UL (ref 3.8–5.4)
ERYTHROCYTE [DISTWIDTH] IN BLOOD: 19.1 % (ref 11.5–15.5)
HCT VFR BLD AUTO: 24 % (ref 34–46)
HGB BLD-MCNC: 7.1 GM/DL (ref 11.4–16)
LYMPHOCYTES # SPEC AUTO: 1.4 K/UL (ref 1–4.8)
LYMPHOCYTES NFR SPEC AUTO: 15 %
MCH RBC QN AUTO: 21 PG (ref 25–35)
MCHC RBC AUTO-ENTMCNC: 29.8 G/DL (ref 31–37)
MCV RBC AUTO: 70.4 FL (ref 80–100)
MONOCYTES # BLD AUTO: 0.4 K/UL (ref 0–1)
MONOCYTES NFR BLD AUTO: 5 %
NEUTROPHILS # BLD AUTO: 6.8 K/UL (ref 1.3–7.7)
NEUTROPHILS NFR BLD AUTO: 77 %
PH UR: 6.5 [PH] (ref 5–8)
PLATELET # BLD AUTO: 218 K/UL (ref 150–450)
SP GR UR: 1.01 (ref 1–1.03)
SQUAMOUS UR QL AUTO: 3 /HPF (ref 0–4)
UROBILINOGEN UR QL STRIP: 2 MG/DL (ref ?–2)
WBC # BLD AUTO: 8.8 K/UL (ref 3.8–10.6)
WBC #/AREA URNS HPF: 19 /HPF (ref 0–5)

## 2018-12-19 PROCEDURE — 96361 HYDRATE IV INFUSION ADD-ON: CPT

## 2018-12-19 PROCEDURE — 85025 COMPLETE CBC W/AUTO DIFF WBC: CPT

## 2018-12-19 PROCEDURE — 99214 OFFICE O/P EST MOD 30 MIN: CPT

## 2018-12-19 PROCEDURE — 81001 URINALYSIS AUTO W/SCOPE: CPT

## 2018-12-19 PROCEDURE — 96374 THER/PROPH/DIAG INJ IV PUSH: CPT

## 2018-12-19 PROCEDURE — 96375 TX/PRO/DX INJ NEW DRUG ADDON: CPT

## 2018-12-19 PROCEDURE — 59025 FETAL NON-STRESS TEST: CPT

## 2018-12-20 ENCOUNTER — HOSPITAL ENCOUNTER (OUTPATIENT)
Dept: HOSPITAL 47 - FBPOP | Age: 23
Discharge: HOME | End: 2018-12-20
Attending: OBSTETRICS & GYNECOLOGY
Payer: COMMERCIAL

## 2018-12-20 VITALS
RESPIRATION RATE: 16 BRPM | DIASTOLIC BLOOD PRESSURE: 58 MMHG | SYSTOLIC BLOOD PRESSURE: 120 MMHG | TEMPERATURE: 98.8 F | HEART RATE: 90 BPM

## 2018-12-20 DIAGNOSIS — Z3A.31: ICD-10-CM

## 2018-12-20 DIAGNOSIS — O23.43: Primary | ICD-10-CM

## 2018-12-20 PROCEDURE — 59025 FETAL NON-STRESS TEST: CPT

## 2018-12-20 PROCEDURE — 99213 OFFICE O/P EST LOW 20 MIN: CPT

## 2018-12-31 NOTE — P.MSEPDOC
Presenting Problems





- Arrival Data


Date of Arrival on Unit: 18


Time of Arrival on Unit: 03:20


Mode of Transport: Wheelchair





- Complaint


OB-Reason for Admission/Chief Complaint: Possible Onset of Labor, Headache


Comment: back and hip pain,headache





Prenatal Medical History





- Pregnancy Information


: 4


Para: 3


Term: 2


: 1


Abortions: Spontaneous or Elective: 0


Number of Living Children: 2





- Gestational Age


Gestational Age by MONTSE (wks/days): 29 Weeks and 6 Days





- Prenatal History


Pregnancy Complications: Prior , Prior 





Review of Systems





- Review of Systems


Constitutional: No problems


Breast: No problems


ENT: No problems


Cardiovascular: No problems


Respiratory: No problems


Gastrointestinal: No problems


Genitourinary: No problems


Musculoskeletal: No problems


Neurological: No problems


Skin: No problems





Vital Signs





- Temperature


Temperature: 98.1 F


Temperature Source: Temporal Artery Scan





- Pulse


  ** Right Brachial


Pulse Rate: 97


Pulse Assessment Method: Automatic Cuff





- Respirations


Respiratory Rate: 16


Oxygen Delivery Method: Room Air





- Blood Pressure


  ** Right Arm


Blood Pressure: 113/58


Blood Pressure Mean: 76


Blood Pressure Source: Automatic Cuff





Medical Screen Scoring (Pre)





- Cervical Exam


Dilation: 1-3 cm = 1


Membranes: Intact





- Uterine Contractions


Frequency: N/A


Duration: N/A


Intensity: N/A





- Maternal Vital Signs


Maternal Temperature: N/A


Maternal Blood Pressure: N/A


Signs of Preeclampsia: N/A


Maternal Respirations: N/A





- Pain Assessment


Pain Location and Character: Back, Hip


Pain Scale Used: Numeric (1 - 10)


Pain Intensity: 9


Pain Description: *Acute, Aching


Pain Frequency: Constant


Pain Duration: 4


Pain Duration Units: Hours


Pain Behavior: None Exhibited


Pain Aggravating Factors: Prolonged Bedrest, Sitting


Pharmacological Interventions: Discuss Pain Med Options


Non-Pharmacological Interventions: Position/Reposition, Reduce Environmental 

Stimuli





- Fetal Assessment


Baseline FHR: 135


Fetal Heart Rate - NICHD Category: Category I (Normal) = 0


NST: Reactive


Fetal Position: N/A


Fetal Station: N/A





- Total Score


Total Score (Pre): 1





- Level of Risk


Level of Risk: Low (0-5)





Physician Notification (Pre)





- Physician Notified


Physician Notified Date: 18


Physician Notified Time: 04:15


Physician/Practitioner Notifed:: Vikash


Spoke With: Vikash


New Order Received: Yes (IV hydration, ofirmev, d/c if feeling better)





Physician Notification (Post)





- Notification Comment


Comment: SpO2: 99 %.  97 bpm.  Pt states pain is slightly improved, would like 

to go home





Disposition





- Disposition


OB Disposition: Discharge to home, Written follow up instructions reviewed


Discharge Date: 18


Discharge Time: 05:10


I agree with the RN Medical Screening Exam: Yes


Risk & Benefit of care provided described in d/c instruction: Yes


Diagnosis: PREGNANCY RELATED CONDITIONS, UNSPECIFIED, SECOND TRIMESTER

## 2019-01-04 ENCOUNTER — HOSPITAL ENCOUNTER (OUTPATIENT)
Dept: HOSPITAL 47 - FBPOP | Age: 24
Discharge: HOME | End: 2019-01-04
Attending: OBSTETRICS & GYNECOLOGY
Payer: COMMERCIAL

## 2019-01-04 VITALS
RESPIRATION RATE: 16 BRPM | DIASTOLIC BLOOD PRESSURE: 65 MMHG | SYSTOLIC BLOOD PRESSURE: 134 MMHG | TEMPERATURE: 97.3 F | HEART RATE: 105 BPM

## 2019-01-04 DIAGNOSIS — Z3A.33: ICD-10-CM

## 2019-01-04 DIAGNOSIS — O47.03: Primary | ICD-10-CM

## 2019-01-04 LAB
KETONES UR QL STRIP.AUTO: (no result)
PH UR: 6 [PH] (ref 5–8)
RBC UR QL: <1 /HPF (ref 0–5)
SP GR UR: 1.01 (ref 1–1.03)
UROBILINOGEN UR QL STRIP: <2 MG/DL (ref ?–2)
WBC #/AREA URNS HPF: 5 /HPF (ref 0–5)

## 2019-01-04 PROCEDURE — 82731 ASSAY OF FETAL FIBRONECTIN: CPT

## 2019-01-04 PROCEDURE — 81001 URINALYSIS AUTO W/SCOPE: CPT

## 2019-01-04 PROCEDURE — 99213 OFFICE O/P EST LOW 20 MIN: CPT

## 2019-01-04 PROCEDURE — 59025 FETAL NON-STRESS TEST: CPT

## 2019-01-10 NOTE — P.MSEPDOC
Presenting Problems





- Arrival Data


Date of Arrival on Unit: 19


Time of Arrival on Unit: 20:10


Mode of Transport: Wheelchair





- Complaint


OB-Reason for Admission/Chief Complaint: Possible Onset of Labor


Comment: contx 6-7 minutes apart starting at 1940





Prenatal Medical History





- Pregnancy Information


: 4


Para: 3


Term: 2


: 1


Abortions: Spontaneous or Elective: 0


Number of Living Children: 3





- Gestational Age


Gestational Age by MONTSE (wks/days): 33 Weeks and 2 Days





- Prenatal History


Pregnancy Complications: Prior , Prior 





Review of Systems





- Review of Systems


Constitutional: No problems


Breast: No problems


ENT: No problems


Cardiovascular: No problems


Respiratory: No problems


Gastrointestinal: No problems


Genitourinary: No problems


Musculoskeletal: No problems


Neurological: No problems


Skin: No problems





Vital Signs





- Temperature


Temperature: 97.3 F


Temperature Source: Temporal Artery Scan





- Pulse


  ** Right Sitting Brachial


Pulse Rate: 105


Pulse Assessment Method: Automatic Cuff





- Respirations


Respiratory Rate: 16


Oxygen Delivery Method: Room Air





- Blood Pressure


  ** Right Arm Sitting


Blood Pressure: 134/65


Blood Pressure Mean: 88


Blood Pressure Source: Automatic Cuff





Medical Screen Scoring (Pre)





- Cervical Exam


Dilation: 1-3 cm = 1


Membranes: Intact





- Uterine Contractions


Frequency: > 5 minutes apart = 1


Duration: N/A


Intensity: N/A





- Maternal Vital Signs


Maternal Temperature: N/A


Maternal Blood Pressure: N/A


Signs of Preeclampsia: N/A


Maternal Respirations: N/A





- Pain Assessment


Pain Location and Character: Medial, Abdomen


Pain Scale Used: Numeric (1 - 10)


Pain Intensity: 9


Pain Management Goal: 4


Pain Description: *Acute, Cramping


Pain Radiation Location: n/a


Pain Frequency: Intermittent


Pain Duration: 30


Pain Duration Units: Minutes


Pain Behavior: Vocalization


Pain Aggravating Factors: Activity





- Fetal Assessment


Baseline FHR: 140


Fetal Heart Rate - NICHD Category: Category I (Normal) = 0


NST: Reactive


Fetal Position: N/A


Fetal Station: N/A





- Total Score


Total Score (Pre): 2





- Level of Risk


Level of Risk: Low (0-5)





Physician Notification (Pre)





- Physician Notified


Physician Notified Date: 19


Physician Notified Time: 20:30


Physician/Practitioner Notifed:: Dr. Roblero


Spoke With: Dr. Roblero


New Order Received: Yes





- Notification Comment


Comment: send FFN, obtain UA, orally hydrate pt and give tylenol for pain





Medical Screen Scoring (Post)





- Cervical Exam


Dilation: 1-3 cm = 1


Effacement: Exam Deferred


Membranes: Intact





- Uterine Contractions


Frequency: > 5 minutes apart = 1


Duration: N/A





- Maternal Vital Signs


Maternal Temperature: N/A


Maternal Blood Pressure: N/A


Signs of Preeclampsia: N/A


Maternal Respirations: N/A





- Pain Assessment


Pain Location and Character: Back


Pain Scale Used: Numeric (1 - 10)


Pain Description: *Acute, Cramping


Pain Radiation Location: none


Pain Frequency: Constant


Pain Duration: 2


Pain Duration Units: Hours





- Maternal Trauma


Maternal Trauma: N/A





- Fetal Assessment


Fetal Heart Rate: 135


Fetal Heart Rate - NICHD Category: Category I (Normal) = 0


NST: Reactive


Fetal Position: N/A


Fetal Station: N/A





- Total Score


Total Score (Post): 2





- Post Treatment Level of Risk


Post Treatment Level of Risk: Low (0-5)





Physician Notification (Post)





- Physician Notified


Physician Notified Date: 19


Physician Notified Time: 21:40


Physician/Practitioner Notified:: Dr. Roblero


Spoke With: Dr. Roblero


New Order Received: Yes





- Notification Comment


Comment: discharge pt home and follow up with Dr. Hidalgo on Monday





Disposition





- Disposition


OB Disposition: Triage, Discharge to home, Written follow up instructions 

reviewed


Discharge Date: 19


Discharge Time: 21:55


I agree with the RN Medical Screening Exam: Yes


Risk & Benefit of care provided described in d/c instruction: Yes


Diagnosis: FALSE LABOR BEFORE 37 COMPLETED WEEKS OF GEST, SECOND TRI

## 2019-01-16 ENCOUNTER — HOSPITAL ENCOUNTER (OUTPATIENT)
Dept: HOSPITAL 47 - FBPOP | Age: 24
Discharge: HOME | End: 2019-01-16
Attending: OBSTETRICS & GYNECOLOGY
Payer: COMMERCIAL

## 2019-01-16 VITALS — TEMPERATURE: 98.1 F | RESPIRATION RATE: 16 BRPM

## 2019-01-16 VITALS — HEART RATE: 89 BPM | DIASTOLIC BLOOD PRESSURE: 56 MMHG | SYSTOLIC BLOOD PRESSURE: 116 MMHG

## 2019-01-16 DIAGNOSIS — Z3A.35: ICD-10-CM

## 2019-01-16 DIAGNOSIS — O47.03: Primary | ICD-10-CM

## 2019-01-16 PROCEDURE — 99214 OFFICE O/P EST MOD 30 MIN: CPT

## 2019-01-16 PROCEDURE — 59025 FETAL NON-STRESS TEST: CPT

## 2019-01-16 PROCEDURE — 96360 HYDRATION IV INFUSION INIT: CPT

## 2019-01-17 ENCOUNTER — HOSPITAL ENCOUNTER (OUTPATIENT)
Dept: HOSPITAL 47 - FBPOP | Age: 24
Discharge: HOME | End: 2019-01-17
Attending: OBSTETRICS & GYNECOLOGY
Payer: COMMERCIAL

## 2019-01-17 VITALS
DIASTOLIC BLOOD PRESSURE: 57 MMHG | RESPIRATION RATE: 16 BRPM | HEART RATE: 83 BPM | TEMPERATURE: 97.3 F | SYSTOLIC BLOOD PRESSURE: 116 MMHG

## 2019-01-17 DIAGNOSIS — Z3A.35: ICD-10-CM

## 2019-01-17 DIAGNOSIS — O47.03: Primary | ICD-10-CM

## 2019-01-17 PROCEDURE — 59025 FETAL NON-STRESS TEST: CPT

## 2019-01-17 PROCEDURE — 99213 OFFICE O/P EST LOW 20 MIN: CPT

## 2019-01-22 NOTE — P.MSEPDOC
Presenting Problems





- Arrival Data


Date of Arrival on Unit: 19


Time of Arrival on Unit: 02:17


Mode of Transport: Wheelchair





- Complaint


OB-Reason for Admission/Chief Complaint: Possible Onset of Labor





Prenatal Medical History





- Pregnancy Information


: 4


Para: 3


Term: 2


: 1


Number of Living Children: 3





- Gestational Age


Gestational Age by MONTSE (wks/days): 35 Weeks and 0 Days





- Prenatal History


Pregnancy Complications: Prior , Prior 





Review of Systems





- Review of Systems


Constitutional: No problems


Breast: No problems


ENT: No problems


Cardiovascular: No problems


Respiratory: No problems


Gastrointestinal: Diarrhea


Genitourinary: No problems


Musculoskeletal: No problems


Neurological: No problems


Skin: No problems





Vital Signs





- Temperature


Temperature: 98.1 F


Temperature Source: Temporal Artery Scan





- Pulse


  ** Right Brachial


Pulse Rate: 89


Pulse Assessment Method: Automatic Cuff





- Respirations


Respiratory Rate: 16


Oxygen Delivery Method: Room Air





- Blood Pressure


  ** Right Arm


Blood Pressure: 116/56


Blood Pressure Mean: 76


Blood Pressure Source: Automatic Cuff





Medical Screen Scoring (Pre)





- Cervical Exam


Dilation: 1-3 cm = 1


Membranes: Intact





- Uterine Contractions


Frequency: < 36 weeks = 6


Duration: > 40 seconds = 2





- Pain Assessment


Pain Location and Character: Lower, Abdomen


Pain Scale Used: Numeric (1 - 10)


Pain Intensity: 6


Pain Description: Cramping


Pain Frequency: Intermittent


Pain Duration: 2


Pain Duration Units: Hours


Pain Behavior: None Exhibited


Pain Aggravating Factors: Contractions





- Maternal Trauma


Maternal Trauma: N/A





- Fetal Assessment


Fetal Heart Rate - NICHD Category: Category I (Normal) = 0


NST: Reactive





- Total Score


Total Score (Pre): 9





- Level of Risk


Level of Risk: Medium (6-9)





Physician Notification (Pre)





- Physician Notified


Physician Notified Date: 19


Physician Notified Time: 02:44


Physician/Practitioner Notifed:: Dr. Ambriz


Spoke With: Dr. Ambriz


New Order Received: Yes





- Notification Comment


Comment: Patient may be discharged home if no cervical change and if vital 

signs are WNL





Medical Screen Scoring (Post)





- Cervical Exam


Dilation: 1-3 cm = 1


Membranes: Intact





- Uterine Contractions


Frequency: > 5 minutes apart = 1


Duration: > 40 seconds = 2


Intensity: N/A





- Maternal Vital Signs


Maternal Temperature: N/A


Maternal Blood Pressure: N/A


Signs of Preeclampsia: N/A


Maternal Respirations: N/A





- Pain Assessment


Pain Intensity: 2





- Maternal Trauma


Maternal Trauma: N/A





- Total Score


Total Score (Post): 4





- Post Treatment Level of Risk


Post Treatment Level of Risk: Low (0-5)





Physician Notification (Post)





- Physician Notified


Physician Notified Date: 19


Physician Notified Time: 02:44


Physician/Practitioner Notified:: Dr. Ambriz


Spoke With: Dr. Ambriz





Disposition





- Disposition


OB Disposition: Discharge to home, Written follow up instructions reviewed


Discharge Date: 19


Discharge Time: 03:47


I agree with the RN Medical Screening Exam: Yes


Risk & Benefit of care provided described in d/c instruction: Yes


Diagnosis: FALSE LABOR BEFORE 37 COMPLETED WEEKS OF GEST, THIRD TRI 45yo F w/ PMHx DM2 vs. preDM2, primary essential HTN, obesity, GERD, pancreatitis presents w/ complaint of L buttock/gluteal cleft abscess which has been worsening for the past 5 days.    > Constipation - added laxatives.  Attempt pain control off IV Dilaudid.  Monitor stool output. 45yo F w/ PMHx DM2 vs. preDM2, primary essential HTN, obesity, GERD, pancreatitis presents w/ complaint of L buttock/gluteal cleft abscess which has been worsening for the past 5 days.    > Constipation - Improved.  Continue Colace, Miralax.  Add Senna  > Pain - Encourage pain control with Oxycodone, Dilaudid prn.   see below 45yo F w/ PMHx DM2 vs. preDM2, primary essential HTN, obesity, GERD, pancreatitis presents w/ complaint of L buttock/gluteal cleft abscess which has been worsening for the past 5 days.    > Constipation - Improved.  Continue Colace, Miralax.  Add Senna  > Pain - adjusted Dilaudid prior to dressing changes.  continue Oxycodone, Dilaudid prn.   see below 47yo F w/ PMHx DM2 vs. preDM2, primary essential HTN, obesity, GERD, pancreatitis presents w/ complaint of L buttock/gluteal cleft abscess which has been worsening for the past 5 days.    > Constipation - Improved.  Continue Colace, Miralax  > Pain - add Dilaudid prior to dressing changes.  continue Oxycodone, Dilaudid prn.   see below 47yo F w/ PMHx DM2 vs. preDM2, primary essential HTN, obesity, GERD, pancreatitis presents w/ complaint of L buttock/gluteal cleft abscess which has been worsening for the past 5 days.    > Constipation - Improved.  Continue Colace, Miralax.  Add Senna  > Pain - Encourage pain control with Oxycodone, Dilaudid prn.   see below THIS 40 Y.O. F WITH POOR SUGAR CONTROL AT HOME, PRE-DIABETIC OR NEW DIABETES, HERE FOR LEFT GLUTEAL ABSCESS, FEVER.  NOW S/P I AND D.  possible yeast infection THIS 40 Y.O. F WITH POOR SUGAR CONTROL AT HOME, PRE-DIABETIC OR NEW DIABETES, HERE FOR LEFT GLUTEAL ABSCESS, FEVER.  NOW S/P I AND D.  possible yeast infection  mssa infection

## 2019-01-23 ENCOUNTER — HOSPITAL ENCOUNTER (INPATIENT)
Dept: HOSPITAL 47 - FBPOP | Age: 24
LOS: 2 days | Discharge: HOME | End: 2019-01-25
Attending: OBSTETRICS & GYNECOLOGY | Admitting: OBSTETRICS & GYNECOLOGY
Payer: COMMERCIAL

## 2019-01-23 DIAGNOSIS — Z79.899: ICD-10-CM

## 2019-01-23 DIAGNOSIS — Z3A.36: ICD-10-CM

## 2019-01-23 DIAGNOSIS — D64.9: ICD-10-CM

## 2019-01-23 DIAGNOSIS — O26.893: ICD-10-CM

## 2019-01-23 DIAGNOSIS — Z87.891: ICD-10-CM

## 2019-01-23 DIAGNOSIS — Z67.91: ICD-10-CM

## 2019-01-23 DIAGNOSIS — O34.219: ICD-10-CM

## 2019-01-23 DIAGNOSIS — J45.909: ICD-10-CM

## 2019-01-23 DIAGNOSIS — Z83.3: ICD-10-CM

## 2019-01-23 DIAGNOSIS — F32.9: ICD-10-CM

## 2019-01-23 DIAGNOSIS — F41.9: ICD-10-CM

## 2019-01-23 LAB
BASOPHILS # BLD AUTO: 0 K/UL (ref 0–0.2)
BASOPHILS NFR BLD AUTO: 0 %
EOSINOPHIL # BLD AUTO: 0.1 K/UL (ref 0–0.7)
EOSINOPHIL NFR BLD AUTO: 1 %
ERYTHROCYTE [DISTWIDTH] IN BLOOD BY AUTOMATED COUNT: 3.58 M/UL (ref 3.8–5.4)
ERYTHROCYTE [DISTWIDTH] IN BLOOD: 19.7 % (ref 11.5–15.5)
HCT VFR BLD AUTO: 24.7 % (ref 34–46)
HGB BLD-MCNC: 7.4 GM/DL (ref 11.4–16)
LYMPHOCYTES # SPEC AUTO: 1.5 K/UL (ref 1–4.8)
LYMPHOCYTES NFR SPEC AUTO: 15 %
MCH RBC QN AUTO: 20.7 PG (ref 25–35)
MCHC RBC AUTO-ENTMCNC: 30 G/DL (ref 31–37)
MCV RBC AUTO: 69 FL (ref 80–100)
MONOCYTES # BLD AUTO: 0.5 K/UL (ref 0–1)
MONOCYTES NFR BLD AUTO: 5 %
NEUTROPHILS # BLD AUTO: 8.1 K/UL (ref 1.3–7.7)
NEUTROPHILS NFR BLD AUTO: 78 %
PLATELET # BLD AUTO: 191 K/UL (ref 150–450)
WBC # BLD AUTO: 10.5 K/UL (ref 3.8–10.6)

## 2019-01-23 PROCEDURE — 96360 HYDRATION IV INFUSION INIT: CPT

## 2019-01-23 PROCEDURE — 0HQ9XZZ REPAIR PERINEUM SKIN, EXTERNAL APPROACH: ICD-10-PCS

## 2019-01-23 PROCEDURE — 86880 COOMBS TEST DIRECT: CPT

## 2019-01-23 PROCEDURE — 85025 COMPLETE CBC W/AUTO DIFF WBC: CPT

## 2019-01-23 PROCEDURE — 59025 FETAL NON-STRESS TEST: CPT

## 2019-01-23 PROCEDURE — 86850 RBC ANTIBODY SCREEN: CPT

## 2019-01-23 PROCEDURE — 86901 BLOOD TYPING SEROLOGIC RH(D): CPT

## 2019-01-23 PROCEDURE — 85461 HEMOGLOBIN FETAL: CPT

## 2019-01-23 PROCEDURE — 10907ZC DRAINAGE OF AMNIOTIC FLUID, THERAPEUTIC FROM PRODUCTS OF CONCEPTION, VIA NATURAL OR ARTIFICIAL OPENING: ICD-10-PCS

## 2019-01-23 PROCEDURE — 86870 RBC ANTIBODY IDENTIFICATION: CPT

## 2019-01-23 PROCEDURE — 86900 BLOOD TYPING SEROLOGIC ABO: CPT

## 2019-01-23 PROCEDURE — 99214 OFFICE O/P EST MOD 30 MIN: CPT

## 2019-01-23 PROCEDURE — 3E0234Z INTRODUCTION OF SERUM, TOXOID AND VACCINE INTO MUSCLE, PERCUTANEOUS APPROACH: ICD-10-PCS

## 2019-01-23 RX ADMIN — POTASSIUM CHLORIDE SCH: 14.9 INJECTION, SOLUTION INTRAVENOUS at 19:41

## 2019-01-23 RX ADMIN — POTASSIUM CHLORIDE SCH MLS/HR: 14.9 INJECTION, SOLUTION INTRAVENOUS at 06:30

## 2019-01-23 RX ADMIN — IBUPROFEN PRN MG: 600 TABLET ORAL at 10:36

## 2019-01-23 RX ADMIN — IBUPROFEN PRN MG: 600 TABLET ORAL at 22:47

## 2019-01-23 RX ADMIN — POTASSIUM CHLORIDE SCH: 14.9 INJECTION, SOLUTION INTRAVENOUS at 19:40

## 2019-01-23 RX ADMIN — DOCUSATE SODIUM AND SENNOSIDES SCH: 50; 8.6 TABLET ORAL at 19:44

## 2019-01-23 RX ADMIN — POTASSIUM CHLORIDE SCH MLS/HR: 14.9 INJECTION, SOLUTION INTRAVENOUS at 06:16

## 2019-01-23 NOTE — P.PROBDLV
Vaginal Delivery Note





- .


Vaginal Delivery Note: 





This is a very pleasant 24-year-old  5 para  at 36 and 0/sevenths 

weeks for an estimated due date of 19.  Patient presented to labor and 

delivery sourav every 2 minutes painful in nature.  Patient was noted to 

be 4 cm within 40 minutes progressed to 5.  Patient was admitted to labor and 

delivery amniotomy was performed and clear fluid was obtained.  Patient 

progressed through labor eventually becoming complete and had a normal 

spontaneous vaginal delivery of a viable female infant at 918, weight of 6 lbs. 

5 oz. with Apgars of 8 and 9 at one and 5 minutes respectively.  Patient was 

noted to be Rh- therefore cord blood was taken.  The placenta was then 

spontaneously delivered intact with a three-vessel cord being noted.  

Inspection the patient's vaginal vault a first-degree vaginal laceration was 

noted and this was repaired in the usual fashion with 3-0 Rapide.  The uterus 

is noted to be firm and below the umbilicus at this time.  Estimated blood loss 

300 mL next





Patient and infant tolerated delivery well and are resting comfortably.

## 2019-01-23 NOTE — P.HPOB
History of Present Illness


H&P Date: 19


Chief Complaint: IUP at 36 0/sevenths weeks, active labor





This is a pleasant 24-year-old  5 para 2113 at 36 and 0/7 weeks with an 

estimated due date of 19 based on last menstrual period equal to a 20 week 

ultrasound.  Patient states she started sourav around 1 AM in the 

progressively got stronger and closer together where she presented to the 

hospital on 4:30.  She denied vaginal bleeding or loss of fluid


Patient has been receiving routine prenatal care with myself since the first 

trimester.  Patient did have multiple episodes of  contractions with 

this pregnancy.  She was eventually placed on Procardia and bedrest.  A shunt 

noted side effects with Procardia and discontinued on her own.  Patient has 

known gestational anemia and has been taking iron along with her prenatal 

vitamin.  Patient is Rh- and and Sathish was given at 28 weeks.  History of 

asthma for which she has a rescue inhaler.  Patient had a primary  for 

breech and has had 2 prior spontaneous vaginal deliveries.


On prenatal blood work showed blood type of AB-, rubella immune, RPR nonreactive

, hepatitis B surface antigen negative, HIV negative Rhogam was administered 


 





Review of Systems


Constitutional: Denies chills, Denies fatigue, Denies fever


Ears, nose, mouth and throat: Denies headache


Cardiovascular: Reports leg edema


Respiratory: Denies cough, Denies dyspnea


Gastrointestinal: Denies constipation, Denies diarrhea, Denies nausea, Denies 

vomiting


Genitourinary: Reports pregnant





Past Medical History


Past Medical History: Asthma


Additional Past Medical History / Comment(s): Anemia


History of Any Multi-Drug Resistant Organisms: None Reported


Past Surgical History: No Surgical Hx Reported


Additional Past Surgical History / Comment(s): LEFT LUNG SURGERY AS AN INFANT, 

ear surgery.   section 2016


Past Anesthesia/Blood Transfusion Reactions: No Reported Reaction


Past Psychological History: Anxiety


Smoking Status: Former smoker


Past Alcohol Use History: None Reported


Past Drug Use History: None Reported





- Past Family History


  ** Mother


Family Medical History: Diabetes Mellitus


Additional Family Medical History / Comment(s): heart disease





Medications and Allergies


 Home Medications











 Medication  Instructions  Recorded  Confirmed  Type


 


Prenatal Vit-Iron-Folic Acid 60 mg PO DAILY 18 History





[Prenatal-U Capsule (formulary)]    


 


NIFEdipine XL [Procardia Xl] 60 mg PO DAILY 18 History











 Allergies











Allergy/AdvReac Type Severity Reaction Status Date / Time


 


codeine Allergy  Rash/Hives Verified 19 04:44


 


promethazine HCl Allergy  Rash/Hives Verified 19 04:44





[From Phenergan]     


 


Sulfa (Sulfonamide Allergy  Rash/Hives Verified 19 04:44





Antibiotics)     














Exam


Osteopathic Statement: *.  No significant issues noted on an osteopathic 

structural exam other than those noted in the History and Physical/Consult.


 Vital Signs











  Temp Pulse Resp BP


 


 19 04:45  97.5 F L  98  16  129/68








 Intake and Output











 19





 22:59 06:59 14:59


 


Other:   


 


  Weight  69.4 kg 














Targeted physical exam is performed on this date in general this is a well-

nourished well-developed pregnant female in no acute distress, lungs are noted 

to be clear to auscultation bilaterally heart is noted have a regular rate and 

rhythm abdomen is gravid, on cervical exam she is 7/70/-2 amniotomy is 

performed and clear fluid was obtained, copious amount





Results


Result Diagrams: 


 19 06:15





 Abnormal Lab Results - Last 24 Hours (Table)











  19 Range/Units





  06:15 


 


RBC  3.58 L  (3.80-5.40)  m/uL


 


Hgb  7.4 L  (11.4-16.0)  gm/dL


 


Hct  24.7 L  (34.0-46.0)  %


 


MCV  69.0 L  (80.0-100.0)  fL


 


MCH  20.7 L  (25.0-35.0)  pg


 


MCHC  30.0 L  (31.0-37.0)  g/dL


 


RDW  19.7 H  (11.5-15.5)  %


 


Neutrophils #  8.1 H  (1.3-7.7)  k/uL














Assessment and Plan


(1) Active labor


Current Visit: Yes   Status: Acute   Code(s): MLW7934 -    SNOMED Code(s): 

77126687


   





(2) Pregnancy with 36 completed weeks gestation


Current Visit: Yes   Status: Acute   Code(s): Z3A.36 - 36 WEEKS GESTATION OF 

PREGNANCY   SNOMED Code(s): 94108300


   





(3) History of 


Current Visit: No   Status: Acute   Code(s): Z98.891 - HISTORY OF UTERINE SCAR 

FROM PREVIOUS SURGERY   SNOMED Code(s): 994735858


   





(4) Rh negative, maternal


Current Visit: No   Status: Acute   Code(s): O09.899 - SUPERVISION OF OTHER 

HIGH RISK PREGNANCIES, UNSP TRIMESTER   SNOMED Code(s): 984923903


   


Plan: 





Patient admitted to labor and delivery, patient is offered pain management with 

Stadol/epidural which she declines both.  Anticipate normal spontaneous vaginal 

delivery

## 2019-01-24 VITALS — TEMPERATURE: 98.4 F

## 2019-01-24 VITALS — RESPIRATION RATE: 20 BRPM

## 2019-01-24 LAB
BASOPHILS # BLD AUTO: 0 K/UL (ref 0–0.2)
BASOPHILS NFR BLD AUTO: 0 %
EOSINOPHIL # BLD AUTO: 0.3 K/UL (ref 0–0.7)
EOSINOPHIL NFR BLD AUTO: 3 %
ERYTHROCYTE [DISTWIDTH] IN BLOOD BY AUTOMATED COUNT: 3.17 M/UL (ref 3.8–5.4)
ERYTHROCYTE [DISTWIDTH] IN BLOOD: 19.6 % (ref 11.5–15.5)
HCT VFR BLD AUTO: 22.2 % (ref 34–46)
HGB BLD-MCNC: 6.5 GM/DL (ref 11.4–16)
LYMPHOCYTES # SPEC AUTO: 1.8 K/UL (ref 1–4.8)
LYMPHOCYTES NFR SPEC AUTO: 20 %
MCH RBC QN AUTO: 20.5 PG (ref 25–35)
MCHC RBC AUTO-ENTMCNC: 29.2 G/DL (ref 31–37)
MCV RBC AUTO: 70 FL (ref 80–100)
MONOCYTES # BLD AUTO: 0.5 K/UL (ref 0–1)
MONOCYTES NFR BLD AUTO: 6 %
NEUTROPHILS # BLD AUTO: 6.1 K/UL (ref 1.3–7.7)
NEUTROPHILS NFR BLD AUTO: 69 %
PLATELET # BLD AUTO: 164 K/UL (ref 150–450)
WBC # BLD AUTO: 8.8 K/UL (ref 3.8–10.6)

## 2019-01-24 RX ADMIN — DOCUSATE SODIUM AND SENNOSIDES SCH EACH: 50; 8.6 TABLET ORAL at 21:17

## 2019-01-24 RX ADMIN — IBUPROFEN PRN MG: 600 TABLET ORAL at 16:14

## 2019-01-24 RX ADMIN — DOCUSATE SODIUM AND SENNOSIDES SCH: 50; 8.6 TABLET ORAL at 09:17

## 2019-01-24 RX ADMIN — Medication SCH MG: at 09:15

## 2019-01-24 NOTE — P.PNOBGVD
Subjective





- Subjective


Principal diagnosis: PPD 1 


Interval history: 





Patient is doing well.  She denies pain.  She is involuting and voiding without 

difficulty.  She is tolerating a regular diet without nausea or vomiting.  She 

states her lochia is minimal.  She states she is bottle feeding.  The infant 

remains in the nursery secondary to transient breathing issues/temperature 

control.  Mood is good this morning and she denies concerns.


Patient reports: Reports appetite normal, Reports voiding normally, Reports 

pain well controlled, Reports ambulating normally


: doing well (In the nursery remains and O2 support)





Objective





- Latest Vital Signs


Latest vital signs: 


 Vital Signs











  Temp Pulse Resp BP Pulse Ox


 


 19 23:50  98.2 F  84  16  117/70 


 


 19 19:32  97.9 F  86  16  101/62 


 


 19 15:54  97.7 F  77  18  117/80 


 


 19 13:29  97.7 F  90  16  124/71  98


 


 19 11:26  98.0 F  86  16  101/58 


 


 19 10:56  97.6 F  75  16  110/58 


 


 19 10:26  97.2 F L  77  16  116/69 


 


 19 10:11   76  18  117/70 


 


 19 09:56   81  16  114/68 


 


 19 09:41   92  18  118/69 


 


 19 09:26  98.2 F  95  18  118/57 








 Intake and Output











 19





 22:59 06:59 14:59


 


Other:   


 


  # Voids 1 1 














- Exam


Extremities: Present: edema


Abdomen: Present: normal appearance, soft


Uterus: Present: normal, firm





- Labs


Labs: 


 Abnormal Lab Results - Last 24 Hours (Table)











  19 Range/Units





  06:36 


 


RBC  3.17 L  (3.80-5.40)  m/uL


 


Hgb  6.5 L*  (11.4-16.0)  gm/dL


 


Hct  22.2 L  (34.0-46.0)  %


 


MCV  70.0 L  (80.0-100.0)  fL


 


MCH  20.5 L  (25.0-35.0)  pg


 


MCHC  29.2 L  (31.0-37.0)  g/dL


 


RDW  19.6 H  (11.5-15.5)  %














Assessment and Plan


(1) Active labor


Current Visit: Yes   Status: Acute   Code(s): QNT5796 -    SNOMED Code(s): 

42393444


   





(2) Pregnancy with 36 completed weeks gestation


Current Visit: Yes   Status: Acute   Code(s): Z3A.36 - 36 WEEKS GESTATION OF 

PREGNANCY   SNOMED Code(s): 48999015


   





(3) History of 


Current Visit: No   Status: Acute   Code(s): Z98.891 - HISTORY OF UTERINE SCAR 

FROM PREVIOUS SURGERY   SNOMED Code(s): 233037700


   





(4) Rh negative, maternal


Current Visit: No   Status: Acute   Code(s): O09.899 - SUPERVISION OF OTHER 

HIGH RISK PREGNANCIES, UNSP TRIMESTER   SNOMED Code(s): 434067607


   





(5) Anemia


Current Visit: No   Status: Acute   Code(s): D64.9 - ANEMIA, UNSPECIFIED   

SNOMED Code(s): 083036646


   


Plan: 





We'll plan routine postpartum care for this patient, given her anemia that has 

progressively worsened throughout the third trimester we will start iron.  

Patient states she didn't start iron tx secondary to cost.

## 2019-01-25 VITALS — DIASTOLIC BLOOD PRESSURE: 73 MMHG | SYSTOLIC BLOOD PRESSURE: 123 MMHG | HEART RATE: 75 BPM

## 2019-01-25 RX ADMIN — Medication SCH MG: at 11:52

## 2019-01-25 RX ADMIN — DOCUSATE SODIUM AND SENNOSIDES SCH EACH: 50; 8.6 TABLET ORAL at 08:00

## 2019-01-25 RX ADMIN — IBUPROFEN PRN MG: 600 TABLET ORAL at 08:00

## 2019-01-25 NOTE — P.DS
Providers


Date of admission: 


19 06:51





Expected date of discharge: 19


Attending physician: 


Nita Hidalgo





Primary care physician: 


Stated None








- Discharge Diagnosis(es)


(1) Active labor


Current Visit: Yes   Status: Acute   





(2) Pregnancy with 36 completed weeks gestation


Current Visit: Yes   Status: Acute   





(3) History of 


Current Visit: No   Status: Acute   





(4) Rh negative, maternal


Current Visit: No   Status: Acute   





(5) Anemia


Current Visit: No   Status: Acute   





(6) Status post vaginal delivery


Current Visit: Yes   Status: Acute   


Hospital Course: 





This is a 24-year-old  5 para 2113 that presented to labor and delivery 

at 36-0/7 weeks in active labor.  Patient was sourav every 2 minutes and 

noted them to be extremely painful in nature.  Patient was 4 cm and then within 

40 minutes progressed to 5.  She was admitted progressed through labor began 

pushing and had a normal spontaneous vaginal delivery of a viable female infant 

at 1918 with a weight of 6 lbs. 5 oz. and Apgars of 8 and 9 at one and 5 

minutes respectively patient's post partum course has been uneventful.  She is 

ambulating and voiding without difficulty.  She is tolerating regular diet 

without nausea or vomiting.  She is ready for discharge home.  She does state 

she is struggling with some depression issues and we will start an 

antidepressant prior to discharge.


Patient Condition at Discharge: Good





Plan - Discharge Summary


New Discharge Prescriptions: 


No Action


   Prenatal Vit-Iron-Folic Acid [Prenatal-U Capsule (formulary)] 60 mg PO DAILY


   NIFEdipine XL [Procardia Xl] 60 mg PO DAILY


Discharge Medication List





Prenatal Vit-Iron-Folic Acid [Prenatal-U Capsule (formulary)] 60 mg PO DAILY  [History]


NIFEdipine XL [Procardia Xl] 60 mg PO DAILY 18 [History]








Follow up Appointment(s)/Referral(s): 


Nita Hidalgo DO [Doctor of Osteopathic Medicine] - 2 Weeks


Patient Instructions/Handouts:  Vaginal Delivery (DC), Vaginal Delivery (GEN)


Discharge Disposition: HOME SELF-CARE

## 2019-02-26 NOTE — P.MSEPDOC
Presenting Problems





- Arrival Data


Date of Arrival on Unit: 19


Time of Arrival on Unit: 03:56


Mode of Transport: Wheelchair





- Complaint


OB-Reason for Admission/Chief Complaint: Possible Onset of Labor


Comment: Contractions since yesterday, came into triage last night recevied an 

IV fluid bolus, contractions stopped and she was discharged, states that 

contractions have started back up again and are about 3 minutes apart.





Prenatal Medical History





- Pregnancy Information


: 4


Para: 3


Term: 2


: 1


Abortions: Spontaneous or Elective: 0


Number of Living Children: 3





- Gestational Age


Gestational Age by MONTSE (wks/days): 35 Weeks and 1 Days





- Prenatal History


Pregnancy Complications: Prior 





Review of Systems





- Review of Systems


Constitutional: No problems


Breast: No problems


ENT: No problems


Cardiovascular: No problems


Respiratory: No problems


Gastrointestinal: No problems


Genitourinary: No problems


Musculoskeletal: No problems


Neurological: No problems


Skin: No problems





Vital Signs





- Temperature


Temperature: 97.3 F


Temperature Source: Temporal Artery Scan





- Pulse


  ** Pulse Oximetery


Pulse Rate: 83


Pulse Assessment Method: Automatic Cuff





- Respirations


Respiratory Rate: 16


Oxygen Delivery Method: Room Air





- Blood Pressure


  ** Sitting


Blood Pressure: 116/57


Blood Pressure Mean: 76


Blood Pressure Source: Automatic Cuff





Medical Screen Scoring (Pre)





- Cervical Exam


Membranes: Intact





- Uterine Contractions


Frequency: < 36 weeks = 6


Duration: > 40 seconds = 2


Intensity: N/A





- Maternal Vital Signs


Maternal Temperature: N/A


Maternal Blood Pressure: N/A


Signs of Preeclampsia: N/A


Maternal Respirations: N/A





- Maternal Trauma


Maternal Trauma: N/A





- Fetal Assessment


Baseline FHR: 130


Fetal Heart Rate - NICHD Category: Category I (Normal) = 0


NST: Reactive


Fetal Position: N/A


Fetal Station: N/A





- Total Score


Total Score (Pre): 8





- Level of Risk


Level of Risk: Medium (6-9)





Physician Notification (Pre)





- Physician Notified


Physician Notified Date: 19


Physician Notified Time: 04:11


Physician/Practitioner Notifed:: Dr. Roblero


New Order Received: Yes





- Notification Comment


Comment: Intiate iv access, recheck cervix in one hour and call physician with 

report. at 0506 called physician with report orders given to keep patient 

another hour and recheck cervix if no change made okay to discharge home with 

instructions if change is made call physician with report.





Disposition





- Disposition


OB Disposition: Discharge to home, Written follow up instructions reviewed


Discharge Date: 19


Discharge Time: 06:13


I agree with the RN Medical Screening Exam: Yes


Risk & Benefit of care provided described in d/c instruction: Yes


Diagnosis: FALSE LABOR, UNSPECIFIED

## 2019-03-06 NOTE — P.MSEPDOC
Presenting Problems





- Arrival Data


Date of Arrival on Unit: 18


Time of Arrival on Unit: 19:51


Mode of Transport: Ambulatory





- Complaint


OB-Reason for Admission/Chief Complaint: Pain


Comment: rates back pain an 8





Prenatal Medical History





- Pregnancy Information


: 4


Para: 3


Term: 2


: 1


Abortions: Spontaneous or Elective: 0


Number of Living Children: 3





- Gestational Age


Gestational Age by MONTSE (wks/days): 31 Weeks and 1 Days





- Prenatal History


Pregnancy Complications: Prior , Prior 





Review of Systems





- Review of Systems


Constitutional: No problems


Breast: No problems


ENT: No problems


Cardiovascular: No problems


Respiratory: No problems


Gastrointestinal: No problems


Genitourinary: Increased frequency


Musculoskeletal: No problems


Neurological: No problems


Skin: No problems





Vital Signs





- Temperature


Temperature: 98.8 F


Temperature Source: Oral





- Pulse


  ** Right Sitting Brachial


Pulse Rate: 90


Pulse Assessment Method: Automatic Cuff





- Respirations


Respiratory Rate: 16


Oxygen Delivery Method: Room Air





- Blood Pressure


  ** Right Arm Sitting


Blood Pressure: 120/58


Blood Pressure Mean: 78


Blood Pressure Source: Automatic Cuff





Medical Screen Scoring (Pre)





- Cervical Exam


Dilation: 1-3 cm = 1


Membranes: Intact





- Uterine Contractions


Frequency: N/A


Duration: N/A


Intensity: N/A





- Maternal Vital Signs


Maternal Temperature: N/A


Maternal Blood Pressure: N/A


Signs of Preeclampsia: N/A





- Pain Assessment


Pain Location and Character: Lower, Back


Pain Scale Used: Numeric (1 - 10)


Pain Intensity: 8


Pain Description: Aching


Pain Frequency: Constant


Pain Duration Units: Hours


Pain Behavior: None Exhibited


Pain Aggravating Factors: Walking





- Total Score


Total Score (Pre): 1





- Level of Risk


Level of Risk: Low (0-5)





Physician Notification (Pre)





- Physician Notified


Physician Notified Date: 18


Physician Notified Time: 20:13


Physician/Practitioner Notifed:: DR FATIMA


New Order Received: Yes





- Notification Comment


Comment: Macrobid e scribed to [facility] on [address]





Disposition





- Disposition


OB Disposition: Discharge to home, Written follow up instructions reviewed


Discharge Date: 18


Discharge Time: 20:30


I agree with the RN Medical Screening Exam: Yes


Risk & Benefit of care provided described in d/c instruction: Yes


Diagnosis: URINARY TRACT INFECTION, SITE NOT SPECIFIED

## 2019-03-06 NOTE — P.MSEPDOC
Presenting Problems





- Arrival Data


Date of Arrival on Unit: 18


Time of Arrival on Unit: 15:15


Mode of Transport: Ambulatory





- Complaint


OB-Reason for Admission/Chief Complaint: Acute Nausea/Vomiting


Comment: nausea/vomiting 3-4 x today. pt recently started on zofran, ate well 

yesterday but didn't keep zofran down today. pt also started on procardia and 

taking PRN per pt.





Prenatal Medical History





- Pregnancy Information


: 4


Para: 3


Term: 2


: 1


Abortions: Spontaneous or Elective: 0


Number of Living Children: 3





- Gestational Age


Gestational Age by MONTSE (wks/days): 31 Weeks and 0 Days





- Prenatal History


Pregnancy Complications: Prior 


Comment: cs x1 at 32 weeks





Review of Systems





- Review of Systems


Constitutional: No problems


Breast: No problems


ENT: No problems


Cardiovascular: No problems


Respiratory: No problems


Gastrointestinal: No problems


Genitourinary: No problems


Musculoskeletal: No problems


Neurological: No problems


Skin: No problems





Vital Signs





- Temperature


Temperature: 97.2 F


Temperature Source: Temporal Artery Scan





- Pulse


  ** Right Sitting Brachial


Pulse Rate: 98


Pulse Assessment Method: Automatic Cuff





- Respirations


Respiratory Rate: 18


Oxygen Delivery Method: Room Air


O2 Sat by Pulse Oximetry: 99





- Blood Pressure


  ** Right Arm Sitting


Blood Pressure: 131/63


Blood Pressure Mean: 85


Blood Pressure Source: Automatic Cuff





Medical Screen Scoring (Pre)





- Cervical Exam


Dilation: Exam Deferred





- Uterine Contractions


Frequency: N/A


Duration: N/A


Intensity: N/A





- Maternal Vital Signs


Maternal Temperature: N/A


Maternal Blood Pressure: N/A


Signs of Preeclampsia: N/A


Maternal Respirations: N/A





- Pain Assessment


Pain Scale Used: Numeric (1 - 10)


Pain Intensity: 3


Pain Management Goal: 3





- Maternal Trauma


Maternal Trauma: N/A





- Fetal Assessment


Baseline FHR: 130


Fetal Heart Rate - NICHD Category: Category I (Normal) = 0


NST: Reactive


Fetal Position: N/A


Fetal Station: N/A





- Total Score


Total Score (Pre): 0





- Level of Risk


Level of Risk: Low (0-5)





Physician Notification (Pre)





- Physician Notified


Physician Notified Date: 18


Physician Notified Time: 15:49


Physician/Practitioner Notifed:: Dr Hidalgo


Spoke With: Dr Hidalgo


New Order Received: Yes





- Notification Comment


Comment: IV LR 1 liter bolus, kefzol 2 gram, zofran 4mg. Pt to dc home if 

tolerating crackers and feeling better. pt requesting dc home. planned appt 

tomorrow in the office, pt to follow up as scheduled.





Disposition





- Disposition


OB Disposition: Discharge to home


Discharge Date: 18


Discharge Time: 16:34


I agree with the RN Medical Screening Exam: Yes


Risk & Benefit of care provided described in d/c instruction: Yes


Diagnosis: VOMITING OF PREGNANCY, UNSPECIFIED

## 2020-08-06 ENCOUNTER — HOSPITAL ENCOUNTER (INPATIENT)
Dept: HOSPITAL 47 - FBPOP | Age: 25
LOS: 2 days | Discharge: HOME | End: 2020-08-08
Attending: OBSTETRICS & GYNECOLOGY | Admitting: OBSTETRICS & GYNECOLOGY
Payer: COMMERCIAL

## 2020-08-06 DIAGNOSIS — J45.909: ICD-10-CM

## 2020-08-06 DIAGNOSIS — Z88.5: ICD-10-CM

## 2020-08-06 DIAGNOSIS — Z82.49: ICD-10-CM

## 2020-08-06 DIAGNOSIS — Z98.890: ICD-10-CM

## 2020-08-06 DIAGNOSIS — Z83.3: ICD-10-CM

## 2020-08-06 DIAGNOSIS — Z88.2: ICD-10-CM

## 2020-08-06 DIAGNOSIS — Z3A.35: ICD-10-CM

## 2020-08-06 DIAGNOSIS — M41.9: ICD-10-CM

## 2020-08-06 DIAGNOSIS — O34.219: ICD-10-CM

## 2020-08-06 DIAGNOSIS — H91.92: ICD-10-CM

## 2020-08-06 DIAGNOSIS — Z88.8: ICD-10-CM

## 2020-08-06 LAB
BASOPHILS # BLD AUTO: 0 K/UL (ref 0–0.2)
BASOPHILS NFR BLD AUTO: 0 %
EOSINOPHIL # BLD AUTO: 0.2 K/UL (ref 0–0.7)
EOSINOPHIL NFR BLD AUTO: 2 %
ERYTHROCYTE [DISTWIDTH] IN BLOOD BY AUTOMATED COUNT: 3.92 M/UL (ref 3.8–5.4)
ERYTHROCYTE [DISTWIDTH] IN BLOOD: 18.9 % (ref 11.5–15.5)
HCT VFR BLD AUTO: 27.2 % (ref 34–46)
HGB BLD-MCNC: 7.8 GM/DL (ref 11.4–16)
LYMPHOCYTES # SPEC AUTO: 1.3 K/UL (ref 1–4.8)
LYMPHOCYTES NFR SPEC AUTO: 12 %
MCH RBC QN AUTO: 19.8 PG (ref 25–35)
MCHC RBC AUTO-ENTMCNC: 28.5 G/DL (ref 31–37)
MCV RBC AUTO: 69.4 FL (ref 80–100)
MONOCYTES # BLD AUTO: 0.4 K/UL (ref 0–1)
MONOCYTES NFR BLD AUTO: 4 %
NEUTROPHILS # BLD AUTO: 8.7 K/UL (ref 1.3–7.7)
NEUTROPHILS NFR BLD AUTO: 80 %
PLATELET # BLD AUTO: 216 K/UL (ref 150–450)
WBC # BLD AUTO: 10.8 K/UL (ref 3.8–10.6)

## 2020-08-06 PROCEDURE — 85025 COMPLETE CBC W/AUTO DIFF WBC: CPT

## 2020-08-06 PROCEDURE — 0HQ9XZZ REPAIR PERINEUM SKIN, EXTERNAL APPROACH: ICD-10-PCS

## 2020-08-06 PROCEDURE — 59025 FETAL NON-STRESS TEST: CPT

## 2020-08-06 PROCEDURE — 88307 TISSUE EXAM BY PATHOLOGIST: CPT

## 2020-08-06 PROCEDURE — 86850 RBC ANTIBODY SCREEN: CPT

## 2020-08-06 PROCEDURE — 99213 OFFICE O/P EST LOW 20 MIN: CPT

## 2020-08-06 PROCEDURE — 86901 BLOOD TYPING SEROLOGIC RH(D): CPT

## 2020-08-06 PROCEDURE — 86900 BLOOD TYPING SEROLOGIC ABO: CPT

## 2020-08-06 NOTE — P.HPOB
History of Present Illness


H&P Date: 20


Chief Complaint: Contractions and back pain





This is a 25-year-old female  6 para 4 with an estimated date of 

confinement of 2020, estimated gestational age of 35 and one sevenths 

weeks, who presents to labor and delivery with complaints of back and hip pain 

along with contractions all day however the pain got more intense starting at 

approximately 7 PM tonight.  Prenatal care has been with Dr. Astorga and has been

uncomplicated per patient.  She denies any history of  labor during this 

pregnancy up until now.





Prenatal labs:


Pap smear-within normal limits


GC/chlamydia/Trichomonas-negative


Hepatitis B surface antigen-negative


RPR-nonreactive


Rubella-immune


Blood type-AB-


Antibody screen-negative


Hemoglobin-7.7


One hour Glucola-84


RhoGAM is given at 27 weeks





Obstetrical history: .  Her first 2 deliveries were at term with vaginal 

deliveries.  Her third pregnancy was an emergency  section at 33-3/7 

weeks for active labor with footling breech.  Her fourth pregnancy was a 

miscarriage.  Her fifth pregnancy delivered at 36 weeks with a vaginal birth 

after .





Gynecologic history: No history of sexually transmitted diseases.





Review of Systems


Constitutional: Denies chills, Denies fever


Eyes: denies blurred vision, denies pain


Ears, nose, mouth and throat: Denies headache, Denies sore throat


Cardiovascular: Denies chest pain, Denies shortness of breath


Respiratory: Denies cough


Gastrointestinal: Reports abdominal pain (Contractions)


Genitourinary: Reports pelvic pain, Reports pregnant


Musculoskeletal: Reports low back pain, Reports myalgias (Hip pain)


Integumentary: Denies pruritus, Denies rash


Neurological: Denies numbness, Denies weakness





Past Medical History


Past Medical History: Asthma, Hearing Disorder / Deafness


Additional Past Medical History / Comment(s): Anemia, Scoliosis, 50% hearing 

loss Left Ear.


History of Any Multi-Drug Resistant Organisms: None Reported


Past Surgical History:  Section


Additional Past Surgical History / Comment(s): LEFT LUNG SURGERY AS AN INFANT, 

ear surgery.  C/S x2


Past Anesthesia/Blood Transfusion Reactions: Postoperative Nausea & Vomiting 

(PONV)


Additional Past Anesthesia/Blood Transfusion Reaction / Comment(s): PONV for 

days with ear surgery at 10 years old.


Smoking Status: Never smoker


Past Alcohol Use History: None Reported


Past Drug Use History: None Reported





- Past Family History


  ** Mother


Family Medical History: Diabetes Mellitus


Additional Family Medical History / Comment(s): heart disease





Medications and Allergies


                                Home Medications











 Medication  Instructions  Recorded  Confirmed  Type


 


Pnv,Calcium 72/Iron/Folic Acid 1 tab PO DAILY 20 History





[Prenatal Plus Tablet]    








                                    Allergies











Allergy/AdvReac Type Severity Reaction Status Date / Time


 


codeine Allergy  Nausea Verified 20 19:03


 


promethazine HCl Allergy  Nausea Verified 20 19:03





[From Phenergan]     


 


Sulfa (Sulfonamide Allergy  Rash/Hives Verified 20 19:03





Antibiotics)     














Exam


Osteopathic Statement: *.  No significant issues noted on an osteopathic 

structural exam other than those noted in the History and Physical/Consult.


                                Intake and Output











 20





 06:59 14:59 22:59


 


Other:   


 


  Weight   75.296 kg














HEENT: Within normal limits


Heart: Regular rate and rhythm


Lungs: Clear to auscultation bilaterally


Abdomen: 


Cervix: Currently 6 cm/60%/-3 station with bag of water palpable.  Initially in 

triage she was noted to be 4-1/2 cm and has made cervical change over at least 

an hour period


Fetal heart tones: Category 1


Contractions: Irritability pattern with contractions every 2-5 minutes


Extremities: Negative Homans





Results


Result Diagrams: 


                                 20 20:30





                  Abnormal Lab Results - Last 24 Hours (Table)











  20 Range/Units





  20:30 


 


WBC  10.8 H  (3.8-10.6)  k/uL


 


Hgb  7.8 L  (11.4-16.0)  gm/dL


 


Hct  27.2 L  (34.0-46.0)  %


 


MCV  69.4 L  (80.0-100.0)  fL


 


MCH  19.8 L  (25.0-35.0)  pg


 


MCHC  28.5 L  (31.0-37.0)  g/dL


 


RDW  18.9 H  (11.5-15.5)  %


 


Neutrophils #  8.7 H  (1.3-7.7)  k/uL














Assessment and Plan


(1) 35 weeks gestation of pregnancy


Current Visit: Yes   Status: Acute   Code(s): Z3A.35 - 35 WEEKS GESTATION OF 

PREGNANCY   SNOMED Code(s): 18667686


   





(2) History of 


Current Visit: No   Status: Acute   Code(s): Z98.891 - HISTORY OF UTERINE SCAR 

FROM PREVIOUS SURGERY   SNOMED Code(s): 209535159


   





(3)  labor


Current Visit: No   Status: Acute   Code(s): O60.00 -  LABOR WITHOUT 

DELIVERY, UNSPECIFIED TRIMESTER   SNOMED Code(s): 5881957


   





(4) Rh negative, maternal


Current Visit: No   Status: Acute   Code(s): O09.899 - SUPERVISION OF OTHER HIGH

RISK PREGNANCIES, UNSP TRIMESTER   SNOMED Code(s): 692074500


   


Plan: 





Plan is admission with antibiotic prophylaxis secondary to unknown group B 

streptococcus and  labor.  Will place internal monitors due to history of

previous  section.  Expectant management.  Patient is advised that the 

infant will need to go to level I nursery due to prematurity.  Expectant 

management.

## 2020-08-06 NOTE — P.PROBDLV
Vaginal Delivery Note





- .


Vaginal Delivery Note: 





The patient progressed to complete dilation after artificial rupture of 

membranes with clear fluid noted.  She did receive antibiotic prophylaxis for 

group B streptococcus.  She did receive internal monitoring during labor.  Once 

reaching a rim dilation, she felt a strong urge to push.  She was able to push 

past the rim and infant's head delivered across the perineum followed by the 

anterior shoulder and then the remainder of the infant.  There was a small loop 

of cord next to the infant's head but not around the neck.  Brisk cry was noted 

immediately.  Nose and mouth are bulb suctioned after delivery.  Cord was 

clamped and cut and infant was taken to warmer for evaluation by awaiting 

pediatric staff.  A viable female infant was noted with Apgar scores of 8 at 1 

minute and 9 at 5 minutes and infant weight of 5 lbs. 13 oz.  Placenta delivered

shortly thereafter, appeared to be intact, with a three-vessel cord.  Uterus 

initially contracted well after oxytocin was given and uterine massage was 

carried out.  Inspection of the perineum revealed a small first-degree perineal 

laceration.  A gloved hand was placed within the intrauterine cavity to palpate 

the previous scar.  At this time there was noted to be some pieces of placenta 

that were manually removed.  These were tiny pieces of membrane and what 

appeared to be decidual type reaction.  Several of these pieces were removed.  

Uterus still appeared firm and no active bleeding was noted.  Placenta will be 

sent to pathology due to prematurity.  Infant is taken to level I nursery for 

evaluation due to prematurity.  Perineum was anesthetized with 1% lidocaine and 

then sutured with 3-0 Vicryl suture in a running locked fashion.  Estimated 

blood loss is approximately 100 mL's.  Both mother and infant are in stable 

condition.

## 2020-08-07 VITALS — TEMPERATURE: 98.1 F

## 2020-08-07 LAB
BASOPHILS # BLD AUTO: 0 K/UL (ref 0–0.2)
BASOPHILS NFR BLD AUTO: 0 %
EOSINOPHIL # BLD AUTO: 0.1 K/UL (ref 0–0.7)
EOSINOPHIL NFR BLD AUTO: 1 %
ERYTHROCYTE [DISTWIDTH] IN BLOOD BY AUTOMATED COUNT: 3.58 M/UL (ref 3.8–5.4)
ERYTHROCYTE [DISTWIDTH] IN BLOOD: 19 % (ref 11.5–15.5)
HCT VFR BLD AUTO: 24.6 % (ref 34–46)
HGB BLD-MCNC: 7 GM/DL (ref 11.4–16)
LYMPHOCYTES # SPEC AUTO: 1.1 K/UL (ref 1–4.8)
LYMPHOCYTES NFR SPEC AUTO: 10 %
MCH RBC QN AUTO: 19.7 PG (ref 25–35)
MCHC RBC AUTO-ENTMCNC: 28.6 G/DL (ref 31–37)
MCV RBC AUTO: 68.8 FL (ref 80–100)
MONOCYTES # BLD AUTO: 0.6 K/UL (ref 0–1)
MONOCYTES NFR BLD AUTO: 5 %
NEUTROPHILS # BLD AUTO: 9.1 K/UL (ref 1.3–7.7)
NEUTROPHILS NFR BLD AUTO: 82 %
PLATELET # BLD AUTO: 199 K/UL (ref 150–450)
WBC # BLD AUTO: 11.2 K/UL (ref 3.8–10.6)

## 2020-08-07 RX ADMIN — DOCUSATE SODIUM AND SENNOSIDES SCH EACH: 50; 8.6 TABLET ORAL at 19:53

## 2020-08-07 RX ADMIN — DOCUSATE SODIUM AND SENNOSIDES SCH: 50; 8.6 TABLET ORAL at 19:53

## 2020-08-07 NOTE — P.MSEPDOC
Addended by: HUA BLOOM on: 5/9/2019 12:29 PM     Modules accepted: Orders     Presenting Problems





- Arrival Data


Date of Arrival on Unit: 20


Time of Arrival on Unit: 18:47


Mode of Transport: Ambulatory





Vital Signs





- Temperature


Temperature: 98.0 F


Temperature Source: Oral





- Pulse


  ** Right


Pulse Rate: 71


Pulse Assessment Method: Automatic Cuff





- Respirations


Respiratory Rate: 16


Oxygen Delivery Method: Room Air


O2 Sat by Pulse Oximetry: 98





- Blood Pressure


  ** Right Arm


Blood Pressure: 111/59


Blood Pressure Mean: 76


Blood Pressure Source: Automatic Cuff





Disposition





- Disposition


OB Disposition: Admit


I agree with the RN Medical Screening Exam: Yes


Risk & Benefit of care provided described in d/c instruction: Yes


Diagnosis:  LABOR THIRD TRI W  DELIVERY THIRD TRI, UNSP

## 2020-08-08 VITALS — DIASTOLIC BLOOD PRESSURE: 75 MMHG | HEART RATE: 101 BPM | RESPIRATION RATE: 18 BRPM | SYSTOLIC BLOOD PRESSURE: 114 MMHG

## 2020-08-08 RX ADMIN — DOCUSATE SODIUM AND SENNOSIDES SCH: 50; 8.6 TABLET ORAL at 09:27

## 2020-08-08 NOTE — P.PNOBGVD
Subjective





- Subjective


Principal diagnosis: S/P NVD PPD #1


Interval history: 





Patient seen and examined.  Denies nausea, vomiting, chest pain, shortness of 

breath or calf pain.


Patient reports: Reports appetite normal, Reports voiding normally, Reports pain

well controlled, Reports ambulating normally


: doing well





Objective





- Latest Vital Signs


Latest vital signs: 


                                   Vital Signs











  Temp Pulse Resp BP Pulse Ox


 


 20 00:00  98.1 F  72  16  115/74  98


 


 20 19:40  98.1 F  82  16  116/81  99


 


 20 15:34  97.9 F  68  17  99/65 














- Exam


Lungs: bilateral: normal


Chest: Normal S1, Normal S2


Extremities: Present: normal


Abdomen: Present: normal appearance, soft


Uterus: Present: normal, firm





Assessment and Plan


(1) Normal vaginal delivery


Current Visit: Yes   Status: Acute   Code(s): O80 - ENCOUNTER FOR FULL-TERM 

UNCOMPLICATED DELIVERY   SNOMED Code(s): 57541633


   


Plan: 





1.  Continue postpartum care

## 2020-08-08 NOTE — P.DS
Providers


Date of admission: 


08/06/20 20:50





Expected date of discharge: 08/08/20


Attending physician: 


Yaima Astorga





Primary care physician: 


Stated None








- Discharge Diagnosis(es)


(1) Normal vaginal delivery


Current Visit: Yes   Status: Acute   


Hospital Course: 





Patient presented in labor.  She underwent normal vaginal delivery at 35 weeks. 

Postpartum course uncomplicated.  She'll be discharged home postpartum day #2 in

stable condition to follow-up with me in 6 weeks.





Plan - Discharge Summary


New Discharge Prescriptions: 


No Action


   Pnv,Calcium 72/Iron/Folic Acid [Prenatal Plus Tablet] 1 tab PO DAILY


Discharge Medication List





Pnv,Calcium 72/Iron/Folic Acid [Prenatal Plus Tablet] 1 tab PO DAILY 08/06/20 

[History]

## 2021-08-16 ENCOUNTER — HOSPITAL ENCOUNTER (EMERGENCY)
Dept: HOSPITAL 47 - EC | Age: 26
LOS: 1 days | Discharge: HOME | End: 2021-08-17
Payer: COMMERCIAL

## 2021-08-16 VITALS — TEMPERATURE: 98.2 F

## 2021-08-16 DIAGNOSIS — Z88.5: ICD-10-CM

## 2021-08-16 DIAGNOSIS — M41.9: ICD-10-CM

## 2021-08-16 DIAGNOSIS — M79.10: ICD-10-CM

## 2021-08-16 DIAGNOSIS — Z88.2: ICD-10-CM

## 2021-08-16 DIAGNOSIS — O99.511: ICD-10-CM

## 2021-08-16 DIAGNOSIS — Z88.8: ICD-10-CM

## 2021-08-16 DIAGNOSIS — Z3A.01: ICD-10-CM

## 2021-08-16 DIAGNOSIS — O23.41: Primary | ICD-10-CM

## 2021-08-16 DIAGNOSIS — J45.909: ICD-10-CM

## 2021-08-16 DIAGNOSIS — O26.891: ICD-10-CM

## 2021-08-16 DIAGNOSIS — R51.9: ICD-10-CM

## 2021-08-16 DIAGNOSIS — Z20.822: ICD-10-CM

## 2021-08-16 LAB
ALBUMIN SERPL-MCNC: 4.4 G/DL (ref 3.5–5)
ALP SERPL-CCNC: 63 U/L (ref 38–126)
ALT SERPL-CCNC: 10 U/L (ref 4–34)
ANION GAP SERPL CALC-SCNC: 11 MMOL/L
AST SERPL-CCNC: 26 U/L (ref 14–36)
BASOPHILS # BLD AUTO: 0 K/UL (ref 0–0.2)
BASOPHILS NFR BLD AUTO: 0 %
BUN SERPL-SCNC: 11 MG/DL (ref 7–17)
CALCIUM SPEC-MCNC: 9.4 MG/DL (ref 8.4–10.2)
CHLORIDE SERPL-SCNC: 109 MMOL/L (ref 98–107)
CO2 SERPL-SCNC: 20 MMOL/L (ref 22–30)
EOSINOPHIL # BLD AUTO: 0.1 K/UL (ref 0–0.7)
EOSINOPHIL NFR BLD AUTO: 2 %
ERYTHROCYTE [DISTWIDTH] IN BLOOD BY AUTOMATED COUNT: 4.16 M/UL (ref 3.8–5.4)
ERYTHROCYTE [DISTWIDTH] IN BLOOD: 18.2 % (ref 11.5–15.5)
GLUCOSE SERPL-MCNC: 93 MG/DL (ref 74–99)
HCT VFR BLD AUTO: 28.2 % (ref 34–46)
HGB BLD-MCNC: 8.2 GM/DL (ref 11.4–16)
LYMPHOCYTES # SPEC AUTO: 1.7 K/UL (ref 1–4.8)
LYMPHOCYTES NFR SPEC AUTO: 28 %
MCH RBC QN AUTO: 19.6 PG (ref 25–35)
MCHC RBC AUTO-ENTMCNC: 29 G/DL (ref 31–37)
MCV RBC AUTO: 67.7 FL (ref 80–100)
MONOCYTES # BLD AUTO: 0.4 K/UL (ref 0–1)
MONOCYTES NFR BLD AUTO: 7 %
NEUTROPHILS # BLD AUTO: 3.7 K/UL (ref 1.3–7.7)
NEUTROPHILS NFR BLD AUTO: 61 %
PH UR: 6 [PH] (ref 5–8)
PLATELET # BLD AUTO: 292 K/UL (ref 150–450)
POTASSIUM SERPL-SCNC: 3.8 MMOL/L (ref 3.5–5.1)
PROT SERPL-MCNC: 7.5 G/DL (ref 6.3–8.2)
RBC UR QL: 2 /HPF (ref 0–5)
SODIUM SERPL-SCNC: 140 MMOL/L (ref 137–145)
SP GR UR: 1.02 (ref 1–1.03)
SQUAMOUS UR QL AUTO: 5 /HPF (ref 0–4)
UROBILINOGEN UR QL STRIP: <2 MG/DL (ref ?–2)
WBC # BLD AUTO: 6.1 K/UL (ref 3.8–10.6)
WBC #/AREA URNS HPF: 84 /HPF (ref 0–5)

## 2021-08-16 PROCEDURE — 96365 THER/PROPH/DIAG IV INF INIT: CPT

## 2021-08-16 PROCEDURE — 36415 COLL VENOUS BLD VENIPUNCTURE: CPT

## 2021-08-16 PROCEDURE — 81001 URINALYSIS AUTO W/SCOPE: CPT

## 2021-08-16 PROCEDURE — 96361 HYDRATE IV INFUSION ADD-ON: CPT

## 2021-08-16 PROCEDURE — 84702 CHORIONIC GONADOTROPIN TEST: CPT

## 2021-08-16 PROCEDURE — 80053 COMPREHEN METABOLIC PANEL: CPT

## 2021-08-16 PROCEDURE — 99284 EMERGENCY DEPT VISIT MOD MDM: CPT

## 2021-08-16 PROCEDURE — 87635 SARS-COV-2 COVID-19 AMP PRB: CPT

## 2021-08-16 PROCEDURE — 85025 COMPLETE CBC W/AUTO DIFF WBC: CPT

## 2021-08-16 NOTE — ED
Headache HPI





- General


Chief Complaint: Headache


Stated Complaint: bodyaches, 5weeks pregnant


Time Seen by Provider: 21 22:37


Mode of arrival: ambulatory


Limitations: no limitations





- History of Present Illness


MD Complaint: headache


Onset/Timin


-: days(s)


Onset Description: gradual


Location: diffuse


Severity: mild


Quality: aching, throbbing


Consistency: constant


Improves With: nothing


Worsens With: none


Context: occurred at rest


Treatments Prior to Arrival: none





- Related Data


On Hormonal Birth Control: No


                                Home Medications











 Medication  Instructions  Recorded  Confirmed


 


Prenatal Vit-Iron-Folic Acid 1 cap PO HS 21





[Prenatal-U Capsule (formulary)]   








                                  Previous Rx's











 Medication  Instructions  Recorded


 


Cephalexin [Keflex] 500 mg PO Q6HR #28 cap 21











                                    Allergies











Allergy/AdvReac Type Severity Reaction Status Date / Time


 


promethazine HCl Allergy  Rash/Hives Verified 21 23:02





[From Phenergan]     


 


Sulfa (Sulfonamide Allergy  Rash/Hives Verified 21 23:02





Antibiotics)     


 


codeine AdvReac  Nausea Verified 21 23:02














Review of Systems


ROS Statement: 


Those systems with pertinent positive or pertinent negative responses have been 

documented in the HPI.





ROS Other: All systems not noted in ROS Statement are negative.


Constitutional: Denies: fever, chills, weakness


Eyes: Denies: eye pain, vision change


ENT: Denies: ear pain, throat pain, congestion


Respiratory: Denies: cough, dyspnea


Cardiovascular: Denies: chest pain, palpitations


Gastrointestinal: Denies: abdominal pain, nausea, vomiting


Genitourinary: Denies: dysuria, frequency, hematuria, discharge


Musculoskeletal: Reports: myalgia.  Denies: back pain, arthralgia


Skin: Denies: rash


Neurological: Reports: headache.  Denies: weakness, numbness





Past Medical History


Past Medical History: Asthma, Hearing Disorder / Deafness


Additional Past Medical History / Comment(s): Anemia, Scoliosis, 50% hearing 

loss Left Ear.


History of Any Multi-Drug Resistant Organisms: None Reported


Past Surgical History:  Section


Additional Past Surgical History / Comment(s): LEFT LUNG SURGERY AS AN INFANT, 

ear surgery.  C/S x1


Past Anesthesia/Blood Transfusion Reactions: Postoperative Nausea & Vomiting (PO

NV)


Additional Past Anesthesia/Blood Transfusion Reaction / Comment(s): PONV for 

days with ear surgery at 10 years old.


Past Psychological History: Anxiety, Depression


Smoking Status: Never smoker


Past Alcohol Use History: None Reported


Past Drug Use History: None Reported





- Past Family History


  ** Mother


Family Medical History: Diabetes Mellitus


Additional Family Medical History / Comment(s): heart disease





  ** Father


History Unknown: Yes





General Exam


Limitations: no limitations


General appearance: alert, in no apparent distress


Head exam: Present: atraumatic, normocephalic


Eye exam: Present: normal appearance, PERRL, EOMI.  Absent: scleral icterus, 

conjunctival injection


ENT exam: Present: normal oropharynx, mucous membranes moist


Neck exam: Present: normal inspection, full ROM.  Absent: tenderness, 

meningismus


Respiratory exam: Present: normal lung sounds bilaterally.  Absent: respiratory 

distress, wheezes, rales, rhonchi, stridor


Cardiovascular Exam: Present: regular rate, normal rhythm, normal heart sounds. 

Absent: systolic murmur, diastolic murmur, rubs, gallop


GI/Abdominal exam: Present: soft.  Absent: distended, tenderness, guarding, 

rebound, rigid, mass


Extremities exam: Present: normal inspection, normal capillary refill.  Absent: 

pedal edema, calf tenderness


Back exam: Present: normal inspection.  Absent: CVA tenderness (R), CVA 

tenderness (L)


Neurological exam: Present: alert, oriented X3.  Absent: motor sensory deficit


Skin exam: Present: warm, dry, intact, normal color.  Absent: rash





Course


                                   Vital Signs











  21





  22:21


 


Temperature 98.2 F


 


Pulse Rate 89


 


Respiratory 18





Rate 


 


Blood Pressure 121/81


 


O2 Sat by Pulse 100





Oximetry 














Medical Decision Making





- Lab Data


Result diagrams: 


                                 21 23:14





                                 21 23:14


                                   Lab Results











  21 Range/Units





  23:14 23:14 23:14 


 


WBC  6.1    (3.8-10.6)  k/uL


 


RBC  4.16    (3.80-5.40)  m/uL


 


Hgb  8.2 L    (11.4-16.0)  gm/dL


 


Hct  28.2 L    (34.0-46.0)  %


 


MCV  67.7 L    (80.0-100.0)  fL


 


MCH  19.6 L    (25.0-35.0)  pg


 


MCHC  29.0 L    (31.0-37.0)  g/dL


 


RDW  18.2 H    (11.5-15.5)  %


 


Plt Count  292    (150-450)  k/uL


 


MPV  7.6    


 


Neutrophils %  61    %


 


Lymphocytes %  28    %


 


Monocytes %  7    %


 


Eosinophils %  2    %


 


Basophils %  0    %


 


Neutrophils #  3.7    (1.3-7.7)  k/uL


 


Lymphocytes #  1.7    (1.0-4.8)  k/uL


 


Monocytes #  0.4    (0-1.0)  k/uL


 


Eosinophils #  0.1    (0-0.7)  k/uL


 


Basophils #  0.0    (0-0.2)  k/uL


 


Hypochromasia  Marked    


 


Poikilocytosis  Slight    


 


Anisocytosis  Slight    


 


Microcytosis  Marked    


 


Sodium    140  (137-145)  mmol/L


 


Potassium    3.8  (3.5-5.1)  mmol/L


 


Chloride    109 H  ()  mmol/L


 


Carbon Dioxide    20 L  (22-30)  mmol/L


 


Anion Gap    11  mmol/L


 


BUN    11  (7-17)  mg/dL


 


Creatinine    0.61  (0.52-1.04)  mg/dL


 


Est GFR (CKD-EPI)AfAm    >90  (>60 ml/min/1.73 sqM)  


 


Est GFR (CKD-EPI)NonAf    >90  (>60 ml/min/1.73 sqM)  


 


Glucose    93  (74-99)  mg/dL


 


Calcium    9.4  (8.4-10.2)  mg/dL


 


Total Bilirubin    0.3  (0.2-1.3)  mg/dL


 


AST    26  (14-36)  U/L


 


ALT    10  (4-34)  U/L


 


Alkaline Phosphatase    63  ()  U/L


 


Total Protein    7.5  (6.3-8.2)  g/dL


 


Albumin    4.4  (3.5-5.0)  g/dL


 


HCG, Quant    644.3  mIU/mL


 


Urine Color   Yellow   


 


Urine Appearance   Cloudy H   (Clear)  


 


Urine pH   6.0   (5.0-8.0)  


 


Ur Specific Gravity   1.022   (1.001-1.035)  


 


Urine Protein   Trace H   (Negative)  


 


Urine Glucose (UA)   Negative   (Negative)  


 


Urine Ketones   Negative   (Negative)  


 


Urine Blood   Negative   (Negative)  


 


Urine Nitrite   Negative   (Negative)  


 


Urine Bilirubin   Negative   (Negative)  


 


Urine Urobilinogen   <2.0   (<2.0)  mg/dL


 


Ur Leukocyte Esterase   Large H   (Negative)  


 


Urine RBC   2   (0-5)  /hpf


 


Urine WBC   84 H   (0-5)  /hpf


 


Ur Squamous Epith Cells   5 H   (0-4)  /hpf


 


Urine Bacteria   Many H   (None)  /hpf


 


Urine Mucus   Rare H   (None)  /hpf


 


Coronavirus (PCR)     (Not Detectd)  














  21 Range/Units





  23:14 


 


WBC   (3.8-10.6)  k/uL


 


RBC   (3.80-5.40)  m/uL


 


Hgb   (11.4-16.0)  gm/dL


 


Hct   (34.0-46.0)  %


 


MCV   (80.0-100.0)  fL


 


MCH   (25.0-35.0)  pg


 


MCHC   (31.0-37.0)  g/dL


 


RDW   (11.5-15.5)  %


 


Plt Count   (150-450)  k/uL


 


MPV   


 


Neutrophils %   %


 


Lymphocytes %   %


 


Monocytes %   %


 


Eosinophils %   %


 


Basophils %   %


 


Neutrophils #   (1.3-7.7)  k/uL


 


Lymphocytes #   (1.0-4.8)  k/uL


 


Monocytes #   (0-1.0)  k/uL


 


Eosinophils #   (0-0.7)  k/uL


 


Basophils #   (0-0.2)  k/uL


 


Hypochromasia   


 


Poikilocytosis   


 


Anisocytosis   


 


Microcytosis   


 


Sodium   (137-145)  mmol/L


 


Potassium   (3.5-5.1)  mmol/L


 


Chloride   ()  mmol/L


 


Carbon Dioxide   (22-30)  mmol/L


 


Anion Gap   mmol/L


 


BUN   (7-17)  mg/dL


 


Creatinine   (0.52-1.04)  mg/dL


 


Est GFR (CKD-EPI)AfAm   (>60 ml/min/1.73 sqM)  


 


Est GFR (CKD-EPI)NonAf   (>60 ml/min/1.73 sqM)  


 


Glucose   (74-99)  mg/dL


 


Calcium   (8.4-10.2)  mg/dL


 


Total Bilirubin   (0.2-1.3)  mg/dL


 


AST   (14-36)  U/L


 


ALT   (4-34)  U/L


 


Alkaline Phosphatase   ()  U/L


 


Total Protein   (6.3-8.2)  g/dL


 


Albumin   (3.5-5.0)  g/dL


 


HCG, Quant   mIU/mL


 


Urine Color   


 


Urine Appearance   (Clear)  


 


Urine pH   (5.0-8.0)  


 


Ur Specific Gravity   (1.001-1.035)  


 


Urine Protein   (Negative)  


 


Urine Glucose (UA)   (Negative)  


 


Urine Ketones   (Negative)  


 


Urine Blood   (Negative)  


 


Urine Nitrite   (Negative)  


 


Urine Bilirubin   (Negative)  


 


Urine Urobilinogen   (<2.0)  mg/dL


 


Ur Leukocyte Esterase   (Negative)  


 


Urine RBC   (0-5)  /hpf


 


Urine WBC   (0-5)  /hpf


 


Ur Squamous Epith Cells   (0-4)  /hpf


 


Urine Bacteria   (None)  /hpf


 


Urine Mucus   (None)  /hpf


 


Coronavirus (PCR)  Not Detected  (Not Detectd)  














Disposition


Clinical Impression: 


 Urinary tract infection





Disposition: HOME SELF-CARE


Condition: Good


Instructions (If sedation given, give patient instructions):  Acute Headache 

(ED), Urinary Tract Infection in Pregnancy (ED)


Prescriptions: 


Cephalexin [Keflex] 500 mg PO Q6HR #28 cap


Is patient prescribed a controlled substance at d/c from ED?: No


Referrals: 


Caity Cole MD [Primary Care Provider] - 1-2 days

## 2021-08-17 VITALS — RESPIRATION RATE: 20 BRPM

## 2021-08-17 VITALS — HEART RATE: 90 BPM | SYSTOLIC BLOOD PRESSURE: 125 MMHG | DIASTOLIC BLOOD PRESSURE: 65 MMHG

## 2021-08-17 LAB — HCG SERPL-MCNC: 644.3 MIU/ML

## 2021-09-13 ENCOUNTER — HOSPITAL ENCOUNTER (EMERGENCY)
Dept: HOSPITAL 47 - EC | Age: 26
Discharge: HOME | End: 2021-09-13
Payer: COMMERCIAL

## 2021-09-13 VITALS — RESPIRATION RATE: 18 BRPM | TEMPERATURE: 97.9 F | HEART RATE: 77 BPM

## 2021-09-13 VITALS — DIASTOLIC BLOOD PRESSURE: 70 MMHG | SYSTOLIC BLOOD PRESSURE: 115 MMHG

## 2021-09-13 DIAGNOSIS — Z3A.08: ICD-10-CM

## 2021-09-13 DIAGNOSIS — O99.891: ICD-10-CM

## 2021-09-13 DIAGNOSIS — O99.511: ICD-10-CM

## 2021-09-13 DIAGNOSIS — Z88.5: ICD-10-CM

## 2021-09-13 DIAGNOSIS — J45.909: ICD-10-CM

## 2021-09-13 DIAGNOSIS — O21.9: Primary | ICD-10-CM

## 2021-09-13 DIAGNOSIS — Z79.899: ICD-10-CM

## 2021-09-13 DIAGNOSIS — F41.9: ICD-10-CM

## 2021-09-13 DIAGNOSIS — F32.9: ICD-10-CM

## 2021-09-13 DIAGNOSIS — Z88.2: ICD-10-CM

## 2021-09-13 LAB
ALBUMIN SERPL-MCNC: 4.7 G/DL (ref 3.5–5)
ALP SERPL-CCNC: 66 U/L (ref 38–126)
ALT SERPL-CCNC: 12 U/L (ref 4–34)
ANION GAP SERPL CALC-SCNC: 10 MMOL/L
AST SERPL-CCNC: 36 U/L (ref 14–36)
BASOPHILS # BLD AUTO: 0 K/UL (ref 0–0.2)
BASOPHILS NFR BLD AUTO: 0 %
BUN SERPL-SCNC: 9 MG/DL (ref 7–17)
CALCIUM SPEC-MCNC: 9.6 MG/DL (ref 8.4–10.2)
CHLORIDE SERPL-SCNC: 106 MMOL/L (ref 98–107)
CO2 SERPL-SCNC: 20 MMOL/L (ref 22–30)
EOSINOPHIL # BLD AUTO: 0.1 K/UL (ref 0–0.7)
EOSINOPHIL NFR BLD AUTO: 2 %
ERYTHROCYTE [DISTWIDTH] IN BLOOD BY AUTOMATED COUNT: 4.17 M/UL (ref 3.8–5.4)
ERYTHROCYTE [DISTWIDTH] IN BLOOD: 18 % (ref 11.5–15.5)
GLUCOSE SERPL-MCNC: 90 MG/DL (ref 74–99)
HCT VFR BLD AUTO: 27.4 % (ref 34–46)
HGB BLD-MCNC: 8.1 GM/DL (ref 11.4–16)
LYMPHOCYTES # SPEC AUTO: 1.3 K/UL (ref 1–4.8)
LYMPHOCYTES NFR SPEC AUTO: 20 %
MCH RBC QN AUTO: 19.5 PG (ref 25–35)
MCHC RBC AUTO-ENTMCNC: 29.8 G/DL (ref 31–37)
MCV RBC AUTO: 65.5 FL (ref 80–100)
MONOCYTES # BLD AUTO: 0.4 K/UL (ref 0–1)
MONOCYTES NFR BLD AUTO: 6 %
NEUTROPHILS # BLD AUTO: 4.4 K/UL (ref 1.3–7.7)
NEUTROPHILS NFR BLD AUTO: 69 %
PH UR: 7 [PH] (ref 5–8)
PLATELET # BLD AUTO: 306 K/UL (ref 150–450)
POTASSIUM SERPL-SCNC: 4 MMOL/L (ref 3.5–5.1)
PROT SERPL-MCNC: 8.3 G/DL (ref 6.3–8.2)
SODIUM SERPL-SCNC: 136 MMOL/L (ref 137–145)
SP GR UR: 1.02 (ref 1–1.03)
SQUAMOUS UR QL AUTO: 8 /HPF (ref 0–4)
UROBILINOGEN UR QL STRIP: 2 MG/DL (ref ?–2)
WBC # BLD AUTO: 6.5 K/UL (ref 3.8–10.6)
WBC # UR AUTO: 22 /HPF (ref 0–5)

## 2021-09-13 PROCEDURE — 36415 COLL VENOUS BLD VENIPUNCTURE: CPT

## 2021-09-13 PROCEDURE — 85025 COMPLETE CBC W/AUTO DIFF WBC: CPT

## 2021-09-13 PROCEDURE — 96361 HYDRATE IV INFUSION ADD-ON: CPT

## 2021-09-13 PROCEDURE — 86900 BLOOD TYPING SEROLOGIC ABO: CPT

## 2021-09-13 PROCEDURE — 81001 URINALYSIS AUTO W/SCOPE: CPT

## 2021-09-13 PROCEDURE — 84702 CHORIONIC GONADOTROPIN TEST: CPT

## 2021-09-13 PROCEDURE — 87086 URINE CULTURE/COLONY COUNT: CPT

## 2021-09-13 PROCEDURE — 99284 EMERGENCY DEPT VISIT MOD MDM: CPT

## 2021-09-13 PROCEDURE — 86850 RBC ANTIBODY SCREEN: CPT

## 2021-09-13 PROCEDURE — 96374 THER/PROPH/DIAG INJ IV PUSH: CPT

## 2021-09-13 PROCEDURE — 80053 COMPREHEN METABOLIC PANEL: CPT

## 2021-09-13 PROCEDURE — 86901 BLOOD TYPING SEROLOGIC RH(D): CPT

## 2021-09-13 PROCEDURE — 84443 ASSAY THYROID STIM HORMONE: CPT

## 2021-09-13 NOTE — ED
Nausea/Vomiting/Diarrhea HPI





- General


Chief complaint: Nausea/Vomiting/Diarrhea


Stated complaint: 8 wks preg/dizzy


Time Seen by Provider: 21 17:08


Source: patient


Mode of arrival: ambulatory


Limitations: no limitations





- History of Present Illness


Initial comments: 





Patient is a 26-year-old female, with history of anemia, currently 8 weeks 

pregnant, presenting to the emergency Department with complaints of 

lightheadedness, decreased appetite, nausea and vomiting over the past few days.

 Her OB/GYN did give her Zofran to try however she is unable to tolerate this.  

She did try taking it with some milk and some food, however vomits it up shortly

after.  She denies any abdominal pain, only some occasional cramps over the past

couple weeks which has been consistent with her pregnancy thus far.  She is 

, OB/GYN is Dr. Astorga.  Denies any vaginal bleeding, no chest pain or short

of breath, no cough.  She denies any fevers or chills.  She has no further 

complaints at this time.  Vital signs are stable upon arrival.





- Related Data


                                Home Medications











 Medication  Instructions  Recorded  Confirmed


 


Ondansetron Odt [Zofran Odt] 4 mg PO Q8HR PRN 21








                                  Previous Rx's











 Medication  Instructions  Recorded


 


Cephalexin [Keflex] 500 mg PO Q8H 4 Days #12 cap 21


 


Metoclopramide [Reglan] 10 mg PO TID PRN #15 tab 21











                                    Allergies











Allergy/AdvReac Type Severity Reaction Status Date / Time


 


promethazine HCl Allergy  Rash/Hives Verified 21 18:05





[From Phenergan]     


 


Sulfa (Sulfonamide Allergy  Rash/Hives Verified 21 18:05





Antibiotics)     


 


codeine AdvReac  Nausea Verified 21 18:05














Review of Systems


ROS Statement: 


Those systems with pertinent positive or pertinent negative responses have been 

documented in the HPI.





ROS Other: All systems not noted in ROS Statement are negative.





Past Medical History


Past Medical History: Asthma, Hearing Disorder / Deafness


Additional Past Medical History / Comment(s): Anemia, Scoliosis, 50% hearing 

loss Left Ear.


History of Any Multi-Drug Resistant Organisms: None Reported


Past Surgical History:  Section


Additional Past Surgical History / Comment(s): LEFT LUNG SURGERY AS AN INFANT, 

ear surgery.  C/S x1


Past Anesthesia/Blood Transfusion Reactions: Postoperative Nausea & Vomiting 

(PONV)


Additional Past Anesthesia/Blood Transfusion Reaction / Comment(s): PONV for 

days with ear surgery at 10 years old.


Past Psychological History: Anxiety, Depression


Smoking Status: Never smoker


Past Alcohol Use History: None Reported


Past Drug Use History: None Reported





- Past Family History


  ** Mother


Family Medical History: Diabetes Mellitus


Additional Family Medical History / Comment(s): heart disease





  ** Father


History Unknown: Yes





General Exam





- General Exam Comments


Initial Comments: 





GENERAL: 


Patient is well-developed and well-nourished.  Patient is nontoxic and in no 

acute distress.





HEAD: 


Atraumatic, normocephalic.





EYES:


Pupils equal round and reactive to light, extraocular movements intact, sclera 

anicteric, conjunctiva are normal.  Eyelids were unremarkable.





ENT: 


Nares patent, oropharynx clear without exudates.  Moist mucous membranes.





NECK: 


Normal range of motion, supple without lymphadenopathy or JVD.





LUNGS:


Unlabored respirations.  Breath sounds clear to auscultation bilaterally and 

equal.  No wheezes rales or rhonchi.





HEART:


Regular rate and rhythm without murmurs, rubs or gallops.





ABDOMEN: 


Soft, nontender, normoactive bowel sounds.  No guarding, no rebound.  No masses 

appreciated.





: Deferred 





MUSCULOSKELETAL: 


Normal extremities with adequate strength and normal range of motion, no pitting

or edema.  No clubbing or cyanosis.





NEUROLOGICAL: 


Patient is alert and oriented x 3.  Normal speech, normal gait.   





PSYCH:


Normal mood, normal affect.





SKIN:


 Warm, Dry, normal turgor, no rashes or lesions noted.


Limitations: no limitations





Course


                                   Vital Signs











  21





  16:52


 


Temperature 97.9 F


 


Pulse Rate 77


 


Respiratory 18





Rate 


 


Blood Pressure 115/70


 


O2 Sat by Pulse 100





Oximetry 














Medical Decision Making





- Medical Decision Making





Patient is a 26-year-old female with history of anemia, currently 8 weeks 

pregnant presenting with nausea and vomiting, lightheadedness and decreased 

appetite over the past few days.  No abdominal pain, no vaginal bleeding.  She 

is , OB/GYN is Dr. Astorga.  Laboratory shows a hemoglobin 8.1 today, this is

stable for her.  White count is normal, rest of labs are within normal limits.  

Urine does show 22 wbc's, rare bacteria, urine culture is pending.  Patient 

received Zofran, fluids, does report mild improvement in her symptoms.  Patient 

will be treated for asymptomatic bacteriuria, will also give her a prescription 

for Reglan to try for the nausea and also recommended trial of Benadryl at 

nighttime.  She is agreeable to this plan of care.  She will follow up with her 

OB/GYN.  Return parameters were discussed with her and she verbalized 

understanding.  Case discussed with Dr. Bradley.





- Lab Data


Result diagrams: 


                                 21 17:35





                                 21 17:35


                                   Lab Results











  21 Range/Units





  17:25 17:35 17:35 


 


WBC   6.5   (3.8-10.6)  k/uL


 


RBC   4.17   (3.80-5.40)  m/uL


 


Hgb   8.1 L   (11.4-16.0)  gm/dL


 


Hct   27.4 L   (34.0-46.0)  %


 


MCV   65.5 L   (80.0-100.0)  fL


 


MCH   19.5 L   (25.0-35.0)  pg


 


MCHC   29.8 L   (31.0-37.0)  g/dL


 


RDW   18.0 H   (11.5-15.5)  %


 


Plt Count   306   (150-450)  k/uL


 


MPV   7.7   


 


Neutrophils %   69   %


 


Lymphocytes %   20   %


 


Monocytes %   6   %


 


Eosinophils %   2   %


 


Basophils %   0   %


 


Neutrophils #   4.4   (1.3-7.7)  k/uL


 


Lymphocytes #   1.3   (1.0-4.8)  k/uL


 


Monocytes #   0.4   (0-1.0)  k/uL


 


Eosinophils #   0.1   (0-0.7)  k/uL


 


Basophils #   0.0   (0-0.2)  k/uL


 


Hypochromasia   Marked   


 


Poikilocytosis   Slight   


 


Anisocytosis   Slight   


 


Microcytosis   Marked   


 


Sodium     (137-145)  mmol/L


 


Potassium     (3.5-5.1)  mmol/L


 


Chloride     ()  mmol/L


 


Carbon Dioxide     (22-30)  mmol/L


 


Anion Gap     mmol/L


 


BUN     (7-17)  mg/dL


 


Creatinine     (0.52-1.04)  mg/dL


 


Est GFR (CKD-EPI)AfAm     (>60 ml/min/1.73 sqM)  


 


Est GFR (CKD-EPI)NonAf     (>60 ml/min/1.73 sqM)  


 


Glucose     (74-99)  mg/dL


 


Calcium     (8.4-10.2)  mg/dL


 


Total Bilirubin     (0.2-1.3)  mg/dL


 


AST     (14-36)  U/L


 


ALT     (4-34)  U/L


 


Alkaline Phosphatase     ()  U/L


 


Total Protein     (6.3-8.2)  g/dL


 


Albumin     (3.5-5.0)  g/dL


 


Urine Color    Yellow  


 


Urine Appearance    Cloudy H  (Clear)  


 


Urine pH    7.0  (5.0-8.0)  


 


Ur Specific Gravity    1.022  (1.001-1.035)  


 


Urine Protein    Trace H  (Negative)  


 


Urine Glucose (UA)    Negative  (Negative)  


 


Urine Ketones    Negative  (Negative)  


 


Urine Blood    Negative  (Negative)  


 


Urine Nitrite    Negative  (Negative)  


 


Urine Bilirubin    Negative  (Negative)  


 


Urine Urobilinogen    2.0  (<2.0)  mg/dL


 


Ur Leukocyte Esterase    Large H  (Negative)  


 


Urine WBC    22 H  (0-5)  /hpf


 


Ur Squamous Epith Cells    8 H  (0-4)  /hpf


 


Urine Bacteria    Rare H  (None)  /hpf


 


Urine Mucus    Few H  (None)  /hpf


 


Blood Type  AB Negative    


 


Blood Type Recheck  AB Neg    


 


Bld Type Recheck Status  No    


 


Antibody Screen  NEGATIVE    


 


Spec Expiration Date  21 Range/Units





  17:35 


 


WBC   (3.8-10.6)  k/uL


 


RBC   (3.80-5.40)  m/uL


 


Hgb   (11.4-16.0)  gm/dL


 


Hct   (34.0-46.0)  %


 


MCV   (80.0-100.0)  fL


 


MCH   (25.0-35.0)  pg


 


MCHC   (31.0-37.0)  g/dL


 


RDW   (11.5-15.5)  %


 


Plt Count   (150-450)  k/uL


 


MPV   


 


Neutrophils %   %


 


Lymphocytes %   %


 


Monocytes %   %


 


Eosinophils %   %


 


Basophils %   %


 


Neutrophils #   (1.3-7.7)  k/uL


 


Lymphocytes #   (1.0-4.8)  k/uL


 


Monocytes #   (0-1.0)  k/uL


 


Eosinophils #   (0-0.7)  k/uL


 


Basophils #   (0-0.2)  k/uL


 


Hypochromasia   


 


Poikilocytosis   


 


Anisocytosis   


 


Microcytosis   


 


Sodium  136 L  (137-145)  mmol/L


 


Potassium  4.0  (3.5-5.1)  mmol/L


 


Chloride  106  ()  mmol/L


 


Carbon Dioxide  20 L  (22-30)  mmol/L


 


Anion Gap  10  mmol/L


 


BUN  9  (7-17)  mg/dL


 


Creatinine  0.52  (0.52-1.04)  mg/dL


 


Est GFR (CKD-EPI)AfAm  >90  (>60 ml/min/1.73 sqM)  


 


Est GFR (CKD-EPI)NonAf  >90  (>60 ml/min/1.73 sqM)  


 


Glucose  90  (74-99)  mg/dL


 


Calcium  9.6  (8.4-10.2)  mg/dL


 


Total Bilirubin  0.7  (0.2-1.3)  mg/dL


 


AST  36  (14-36)  U/L


 


ALT  12  (4-34)  U/L


 


Alkaline Phosphatase  66  ()  U/L


 


Total Protein  8.3 H  (6.3-8.2)  g/dL


 


Albumin  4.7  (3.5-5.0)  g/dL


 


Urine Color   


 


Urine Appearance   (Clear)  


 


Urine pH   (5.0-8.0)  


 


Ur Specific Gravity   (1.001-1.035)  


 


Urine Protein   (Negative)  


 


Urine Glucose (UA)   (Negative)  


 


Urine Ketones   (Negative)  


 


Urine Blood   (Negative)  


 


Urine Nitrite   (Negative)  


 


Urine Bilirubin   (Negative)  


 


Urine Urobilinogen   (<2.0)  mg/dL


 


Ur Leukocyte Esterase   (Negative)  


 


Urine WBC   (0-5)  /hpf


 


Ur Squamous Epith Cells   (0-4)  /hpf


 


Urine Bacteria   (None)  /hpf


 


Urine Mucus   (None)  /hpf


 


Blood Type   


 


Blood Type Recheck   


 


Bld Type Recheck Status   


 


Antibody Screen   


 


Spec Expiration Date   














Disposition


Clinical Impression: 


 Asymptomatic bacteriuria during pregnancy, Nausea and vomiting during pregnancy





Disposition: HOME SELF-CARE


Condition: Stable


Instructions (If sedation given, give patient instructions):  Nausea and 

Vomiting in Pregnancy (ED)


Additional Instructions: 


Please return to the Emergency Department if symptoms worsen or any other 

concerns.


Take antibiotic as prescribed for bacteria in your urine.


Trial of Zofran, Reglan for any nausea, may also take Benadryl at nighttime.  


Continue to increase your fluid intake.  


Follow-up with your OB/GYN.


Prescriptions: 


Cephalexin [Keflex] 500 mg PO Q8H 4 Days #12 cap


Metoclopramide [Reglan] 10 mg PO TID PRN #15 tab


 PRN Reason: GERD


Is patient prescribed a controlled substance at d/c from ED?: No


Referrals: 


Caity Cole MD [Primary Care Provider] - 1-2 days


Yaima Astorga DO [Family Provider] - 1-2 days


Time of Disposition: 18:49

## 2021-09-28 ENCOUNTER — HOSPITAL ENCOUNTER (OUTPATIENT)
Dept: HOSPITAL 47 - RADUSWWP | Age: 26
Discharge: HOME | End: 2021-09-28
Attending: OBSTETRICS & GYNECOLOGY
Payer: COMMERCIAL

## 2021-09-28 DIAGNOSIS — Z3A.10: ICD-10-CM

## 2021-09-28 DIAGNOSIS — Z34.91: Primary | ICD-10-CM

## 2021-09-28 PROCEDURE — 76801 OB US < 14 WKS SINGLE FETUS: CPT

## 2021-09-28 PROCEDURE — 76813 OB US NUCHAL MEAS 1 GEST: CPT

## 2021-09-29 NOTE — US
EXAMINATION TYPE: Transabdominal

 

DATE OF EXAM: 9/28/2021 4:40 PM

 

COMPARISON: NONE

 

CLINICAL HISTORY: Z36 confrim dates. Confirm Dates

 

EXAM PERFORMED:  Transabdominal (TA)

 

EXAM MEASUREMENTS:

 

GESTATIONAL AGE / DATING

 

Physician Established: (10 weeks/4 days) 

** EDC:  04/22/2022

Dates by LMP: (10 weeks/4 days)  

** EDC: 04/22/2022

Dates by First Scan:  No previous this is first scan

Dates by Current Scan for: (10 weeks/6 days)  

** EDC: 04/20/2022

 

MATERNAL ANATOMY 

 

Uterus: 13.5 x 7.1 x 11.5 cm

Right Ovary: Unable to visualize due to enlarged uterus and overlying bowel gas

Left Ovary: 3.8 x 3.5 x 1.9 cm

Post CDS / Adnexa: wnl

Presence of free fluid: No

Presence of subchorionic bleed: No

 

GESTATION / FETAL SURVEY

 

CRL: 3.9 cm (10 weeks/6 days)

MSD: wnl

Yolk Sac (normal less than 6mm): 4mm

Heart Rate: 165 bpm

Rhythm:  Normal

IUP:  Viable IUP

Nuchal Translucency 10-14wks (normal less than 3mm): 1mm

 

Date of LMP: 07/16/2021

 

 

 

 

 

 

IMPRESSION:

**Single, viable IUP/ No abnormality appreciated at this time

## 2021-10-03 ENCOUNTER — HOSPITAL ENCOUNTER (EMERGENCY)
Dept: HOSPITAL 47 - EC | Age: 26
Discharge: HOME | End: 2021-10-03
Payer: COMMERCIAL

## 2021-10-03 VITALS — TEMPERATURE: 98.6 F | RESPIRATION RATE: 18 BRPM

## 2021-10-03 VITALS — SYSTOLIC BLOOD PRESSURE: 110 MMHG | DIASTOLIC BLOOD PRESSURE: 56 MMHG | HEART RATE: 84 BPM

## 2021-10-03 DIAGNOSIS — R07.9: Primary | ICD-10-CM

## 2021-10-03 DIAGNOSIS — Z88.2: ICD-10-CM

## 2021-10-03 DIAGNOSIS — Z88.8: ICD-10-CM

## 2021-10-03 DIAGNOSIS — D64.9: ICD-10-CM

## 2021-10-03 DIAGNOSIS — J45.909: ICD-10-CM

## 2021-10-03 DIAGNOSIS — R10.13: ICD-10-CM

## 2021-10-03 DIAGNOSIS — H91.90: ICD-10-CM

## 2021-10-03 DIAGNOSIS — Z88.5: ICD-10-CM

## 2021-10-03 LAB
ALBUMIN SERPL-MCNC: 4.3 G/DL (ref 3.5–5)
ALP SERPL-CCNC: 61 U/L (ref 38–126)
ALT SERPL-CCNC: 8 U/L (ref 4–34)
ANION GAP SERPL CALC-SCNC: 10 MMOL/L
AST SERPL-CCNC: 22 U/L (ref 14–36)
BASOPHILS # BLD AUTO: 0 K/UL (ref 0–0.2)
BASOPHILS NFR BLD AUTO: 0 %
BUN SERPL-SCNC: 10 MG/DL (ref 7–17)
CALCIUM SPEC-MCNC: 9.7 MG/DL (ref 8.4–10.2)
CHLORIDE SERPL-SCNC: 106 MMOL/L (ref 98–107)
CO2 SERPL-SCNC: 20 MMOL/L (ref 22–30)
EOSINOPHIL # BLD AUTO: 0.2 K/UL (ref 0–0.7)
EOSINOPHIL NFR BLD AUTO: 2 %
ERYTHROCYTE [DISTWIDTH] IN BLOOD BY AUTOMATED COUNT: 3.95 M/UL (ref 3.8–5.4)
ERYTHROCYTE [DISTWIDTH] IN BLOOD: 18.3 % (ref 11.5–15.5)
GLUCOSE SERPL-MCNC: 90 MG/DL (ref 74–99)
HCT VFR BLD AUTO: 26.4 % (ref 34–46)
HGB BLD-MCNC: 7.6 GM/DL (ref 11.4–16)
LYMPHOCYTES # SPEC AUTO: 1.3 K/UL (ref 1–4.8)
LYMPHOCYTES NFR SPEC AUTO: 16 %
MCH RBC QN AUTO: 19.2 PG (ref 25–35)
MCHC RBC AUTO-ENTMCNC: 28.8 G/DL (ref 31–37)
MCV RBC AUTO: 66.7 FL (ref 80–100)
MONOCYTES # BLD AUTO: 0.5 K/UL (ref 0–1)
MONOCYTES NFR BLD AUTO: 6 %
NEUTROPHILS # BLD AUTO: 6 K/UL (ref 1.3–7.7)
NEUTROPHILS NFR BLD AUTO: 74 %
PLATELET # BLD AUTO: 246 K/UL (ref 150–450)
POTASSIUM SERPL-SCNC: 3.6 MMOL/L (ref 3.5–5.1)
PROT SERPL-MCNC: 7.3 G/DL (ref 6.3–8.2)
SODIUM SERPL-SCNC: 136 MMOL/L (ref 137–145)
WBC # BLD AUTO: 8 K/UL (ref 3.8–10.6)

## 2021-10-03 PROCEDURE — 80053 COMPREHEN METABOLIC PANEL: CPT

## 2021-10-03 PROCEDURE — 93005 ELECTROCARDIOGRAM TRACING: CPT

## 2021-10-03 PROCEDURE — 99285 EMERGENCY DEPT VISIT HI MDM: CPT

## 2021-10-03 PROCEDURE — 84484 ASSAY OF TROPONIN QUANT: CPT

## 2021-10-03 PROCEDURE — 36415 COLL VENOUS BLD VENIPUNCTURE: CPT

## 2021-10-03 PROCEDURE — 85025 COMPLETE CBC W/AUTO DIFF WBC: CPT

## 2021-10-03 PROCEDURE — 85379 FIBRIN DEGRADATION QUANT: CPT

## 2021-10-03 NOTE — ED
Chest Pain HPI





- General


Chief Complaint: Chest Pain


Stated Complaint: Difficulty Breathing, Chest Pain


Time Seen by Provider: 10/03/21 03:10


Source: patient


Mode of arrival: ambulatory


Limitations: no limitations





- History of Present Illness


MD Complaint: chest pain


-: hour(s)


Onset: during rest


Pain Location: epigastric


Pain Radiation: back


Severity: moderate


Quality: aching


Consistency: constant


Improves With: nothing


Worsens With: nothing


Treatments Prior to Arrival: none





- Related Data


                                Home Medications











 Medication  Instructions  Recorded  Confirmed


 


Ondansetron Odt [Zofran Odt] 4 mg PO Q8HR PRN 21








                                  Previous Rx's











 Medication  Instructions  Recorded


 


Cephalexin [Keflex] 500 mg PO Q8H 4 Days #12 cap 21


 


Metoclopramide [Reglan] 10 mg PO TID PRN #15 tab 21











                                    Allergies











Allergy/AdvReac Type Severity Reaction Status Date / Time


 


promethazine HCl Allergy  Rash/Hives Verified 10/03/21 02:59





[From Phenergan]     


 


Sulfa (Sulfonamide Allergy  Rash/Hives Verified 10/03/21 02:59





Antibiotics)     


 


codeine AdvReac  Nausea Verified 10/03/21 02:59














Review of Systems


ROS Statement: 


Those systems with pertinent positive or pertinent negative responses have been 

documented in the HPI.





ROS Other: All systems not noted in ROS Statement are negative.


Constitutional: Denies: fever, chills, weakness


Respiratory: Denies: cough, dyspnea


Cardiovascular: Reports: as per HPI, chest pain.  Denies: palpitations, dyspnea 

on exertion, orthopnea, edema


Gastrointestinal: Denies: abdominal pain, nausea, vomiting, diarrhea





EKG Findings





- EKG Results:


EKG: interpreted by ERMESTHELA, sinus rhythm (Rate 82 bpm), normal axis, normal QRS, 

normal ST/T, no acute changes





Past Medical History


Past Medical History: Asthma, Hearing Disorder / Deafness


Additional Past Medical History / Comment(s): Anemia, Scoliosis, 50% hearing 

loss Left Ear.


History of Any Multi-Drug Resistant Organisms: None Reported


Past Surgical History:  Section


Additional Past Surgical History / Comment(s): LEFT LUNG SURGERY AS AN INFANT, 

ear surgery.  C/S x1


Past Anesthesia/Blood Transfusion Reactions: Postoperative Nausea & Vomiting 

(PONV)


Additional Past Anesthesia/Blood Transfusion Reaction / Comment(s): PONV for 

days with ear surgery at 10 years old.


Past Psychological History: Anxiety, Depression


Smoking Status: Never smoker


Past Alcohol Use History: None Reported


Past Drug Use History: None Reported





- Past Family History


  ** Mother


Family Medical History: Diabetes Mellitus


Additional Family Medical History / Comment(s): heart disease





  ** Father


History Unknown: Yes





General Exam


Limitations: no limitations


Head exam: Present: atraumatic, normocephalic


Neck exam: Present: normal inspection, full ROM.  Absent: tenderness


Respiratory exam: Present: normal lung sounds bilaterally.  Absent: respiratory 

distress, wheezes, rales, rhonchi


Cardiovascular Exam: Present: regular rate, normal rhythm, normal heart sounds. 

Absent: systolic murmur, diastolic murmur, rubs, gallop


GI/Abdominal exam: Present: soft.  Absent: distended, tenderness, guarding, 

rebound, rigid, mass


Extremities exam: Present: normal inspection, normal capillary refill.  Absent: 

pedal edema, calf tenderness


Back exam: Present: normal inspection.  Absent: CVA tenderness (R), CVA 

tenderness (L)


Neurological exam: Present: alert


Skin exam: Present: warm, dry, intact, normal color.  Absent: rash





Course


                                   Vital Signs











  10/03/21 10/03/21





  02:57 06:54


 


Temperature 98.6 F 


 


Pulse Rate 88 84


 


Respiratory 18 18





Rate  


 


Blood Pressure 112/65 110/56


 


O2 Sat by Pulse 100 98





Oximetry  














Disposition


Clinical Impression: 


 Chest pain, Anemia





Disposition: HOME SELF-CARE


Condition: Good


Instructions (If sedation given, give patient instructions):  Chest Pain (ED), 

Anemia (ED)


Is patient prescribed a controlled substance at d/c from ED?: No


Referrals: 


Caity Cole MD [Primary Care Provider] - 1-2 days